# Patient Record
Sex: FEMALE | Race: WHITE | NOT HISPANIC OR LATINO | Employment: OTHER | URBAN - METROPOLITAN AREA
[De-identification: names, ages, dates, MRNs, and addresses within clinical notes are randomized per-mention and may not be internally consistent; named-entity substitution may affect disease eponyms.]

---

## 2017-11-16 PROBLEM — R11.2 NAUSEA AND VOMITING: Status: ACTIVE | Noted: 2017-11-16

## 2018-03-15 ENCOUNTER — TELEPHONE (OUTPATIENT)
Dept: PHARMACY | Facility: CLINIC | Age: 58
End: 2018-03-15

## 2018-03-21 NOTE — TELEPHONE ENCOUNTER
Faxed Griselda [place] form to Patients insurance ([facility] [phone]) @ 6:48 PM - KAREN

## 2018-03-23 ENCOUNTER — TELEPHONE (OUTPATIENT)
Dept: PHARMACY | Facility: CLINIC | Age: 58
End: 2018-03-23

## 2018-03-23 NOTE — TELEPHONE ENCOUNTER
"Initial Harvoni 90/400mg consult completed on 3/23. Harvoni 90/400mg will be shipped on 3/26 to arrive at patient's home on 3/27 via [employer]. $8.35 copay. Patient will start Harvoni 90/400mg on 3/28. Address confirmed, CC on file. Confirmed 2 patient identifiers - name and . Therapy Appropriate.    Harvoni- Take one tablet by mouth daily x 8 weeks  Counseling was reviewed:   1. Patient MUST take Harvoni at the SAME time every day.   2. Patient MUST avoid acid reducers without consulting with myself or provider first. Antacids are to be spaced out at least 4 hours apart from Harvoni. Patient states that they have omeprazole on med list however use it only "periodically when they are having heartburn and havent taken it in a very long time."  Patient was instructed to suspend all omeprazole at this time and if periodic GERD or other symptoms develop to use a traditional antacid as needed according to the 4 hour separation rules outlined above.  Patient acknowledged understanding.  3. Side effects include headaches and fatigue.   Headache: Patient may treat with OTC remedies. If Tylenol is used, dose should  not exceed 2000mg per day.  Patient advised to account for all sources of acetaminophen in this daily ceiling dose including both rx (norco) and non rx (tylenol) medications that are on current med list.     DDI: Medication list reviewed and potential DDIs addressed. No DDIs or allergies noted. Patient MUST contact myself or provider prior to starting any new OTC, herbal, or prescription drugs to avoid potential DDIs.    Discussed the importance of staying well hydrated while on therapy. Compliance stressed - patient to take missed doses as soon as remembered, but NOT to take 2 doses in one day. Patient will report questions or concerns to myself or practitioner. Patient verbalizes understanding. Patient plans to start Harvoni on 3/28. Consultation included: indication; goals of treatment; administration; storage " and handling; side effects; how to handle side effects; the importance of compliance; how to handle missed doses; the importance of laboratory monitoring; the importance of keeping all follow up appointments.  Patient understands to report any medication changes to [facility] and provider. All questions answered and addressed to patients satisfaction.  I will f/u with patient in 1 week from start, and [facility] will contact patient in 3 weeks to coordinate next refill.    LIZ Jo.Ph.  Clinical Pharmacist  [facility]  Phone: [phone]

## 2018-03-23 NOTE — TELEPHONE ENCOUNTER
DOCUMENTATION ONLY:  Prior authorization for Harvoni approved from 03/22/2018 to 05/22/2018 x 8 weeks of treatment.   Case ID# None    Co-pay: $8.35    Patient Assistance IS NOT required.     Forward to clinical pharmacist for consult & shipment.

## 2018-04-05 ENCOUNTER — TELEPHONE (OUTPATIENT)
Dept: PHARMACY | Facility: CLINIC | Age: 58
End: 2018-04-05

## 2018-04-05 NOTE — TELEPHONE ENCOUNTER
Initial clinical follow-up conducted for Griselda. Name/ confirmed. no missed doses; no new medications; no side effects noted. Patient understands to report any medication changes to [facility] and provider. All questions answered and addressed to patients satisfaction.      LIZ Jo.Ph.  Clinical Pharmacist  [facility]  Phone: [phone]

## 2018-04-16 ENCOUNTER — TELEPHONE (OUTPATIENT)
Dept: PHARMACY | Facility: CLINIC | Age: 58
End: 2018-04-16

## 2018-04-16 NOTE — TELEPHONE ENCOUNTER
Harvoni refill confirmed.  Patient's shipment scheduled for delivery 18.   $0 copay- 004. Confirmed 2 patient identifiers - name and .      Patient reports taking Harvoni routinely and has about 8 doses on hand.  No missed doses, no new medications, no new allergies or health conditions reported at this time. Patient reports mild fatigue but no other issues.  All questions answered and addressed to patients satisfaction. Pt verbalized understanding.    Monty Berumen, PharmD  Clinical Pharmacist  [facility]  [phone]

## 2018-05-30 ENCOUNTER — TELEPHONE (OUTPATIENT)
Dept: PHARMACY | Facility: CLINIC | Age: 58
End: 2018-05-30

## 2018-06-04 NOTE — TELEPHONE ENCOUNTER
Second attempt for qol assessment at [facility]  - na - number did not provide option to leave message - will continue to f/u.     LIZ Jo.Ph.  Clinical Pharmacist  [facility]  Phone: [phone]

## 2018-06-12 NOTE — TELEPHONE ENCOUNTER
"Patient called for QOL assessment at [facility] Harvoni x 12 weeks.  Patient states that they are feeling very well and happy to have completed treatment. She states that previous "stomach issues" she was having have resolved since being at [facility].  Patient reminded of the importance and significance of SVR 12 testing and counseled to keep all appts, labs, and follow-ups through SVR 12 and beyond.     LIZ Jo.Ph.  Clinical Pharmacist  [facility]  Phone: [phone]      "

## 2019-11-18 ENCOUNTER — HOSPITAL ENCOUNTER (EMERGENCY)
Facility: HOSPITAL | Age: 59
Discharge: HOME | End: 2019-11-18
Attending: SURGERY
Payer: MEDICARE

## 2019-11-18 VITALS
SYSTOLIC BLOOD PRESSURE: 101 MMHG | HEART RATE: 109 BPM | WEIGHT: 109.38 LBS | DIASTOLIC BLOOD PRESSURE: 65 MMHG | OXYGEN SATURATION: 95 % | BODY MASS INDEX: 21.47 KG/M2 | HEIGHT: 60 IN | TEMPERATURE: 99 F | RESPIRATION RATE: 20 BRPM

## 2019-11-18 DIAGNOSIS — J40 BRONCHITIS: Primary | ICD-10-CM

## 2019-11-18 LAB
DEPRECATED S PYO AG THROAT QL EIA: NEGATIVE
INFLUENZA A, MOLECULAR: NEGATIVE
INFLUENZA B, MOLECULAR: NEGATIVE
SPECIMEN SOURCE: NORMAL

## 2019-11-18 PROCEDURE — 25000242 PHARM REV CODE 250 ALT 637 W/ HCPCS: Performed by: SURGERY

## 2019-11-18 PROCEDURE — 96372 THER/PROPH/DIAG INJ SC/IM: CPT

## 2019-11-18 PROCEDURE — 94640 AIRWAY INHALATION TREATMENT: CPT

## 2019-11-18 PROCEDURE — 99284 EMERGENCY DEPT VISIT MOD MDM: CPT | Mod: 25

## 2019-11-18 PROCEDURE — 87081 CULTURE SCREEN ONLY: CPT

## 2019-11-18 PROCEDURE — 63600175 PHARM REV CODE 636 W HCPCS: Performed by: SURGERY

## 2019-11-18 PROCEDURE — 87880 STREP A ASSAY W/OPTIC: CPT

## 2019-11-18 PROCEDURE — 87502 INFLUENZA DNA AMP PROBE: CPT

## 2019-11-18 RX ORDER — METHYLPREDNISOLONE SOD SUCC 125 MG
125 VIAL (EA) INJECTION
Status: COMPLETED | OUTPATIENT
Start: 2019-11-18 | End: 2019-11-18

## 2019-11-18 RX ORDER — PROMETHAZINE HYDROCHLORIDE AND DEXTROMETHORPHAN HYDROBROMIDE 6.25; 15 MG/5ML; MG/5ML
5 SYRUP ORAL EVERY 6 HOURS PRN
Qty: 118 ML | Refills: 0 | Status: SHIPPED | OUTPATIENT
Start: 2019-11-18 | End: 2019-11-28

## 2019-11-18 RX ORDER — LEVOFLOXACIN 500 MG/1
500 TABLET, FILM COATED ORAL DAILY
Qty: 7 TABLET | Refills: 0 | Status: SHIPPED | OUTPATIENT
Start: 2019-11-18 | End: 2019-11-25

## 2019-11-18 RX ORDER — CEFTRIAXONE 1 G/1
1 INJECTION, POWDER, FOR SOLUTION INTRAMUSCULAR; INTRAVENOUS
Status: COMPLETED | OUTPATIENT
Start: 2019-11-18 | End: 2019-11-18

## 2019-11-18 RX ORDER — METHYLPREDNISOLONE 4 MG/1
TABLET ORAL
Qty: 1 PACKAGE | Refills: 0 | Status: ON HOLD | OUTPATIENT
Start: 2019-11-18 | End: 2020-10-16 | Stop reason: CLARIF

## 2019-11-18 RX ORDER — IPRATROPIUM BROMIDE AND ALBUTEROL SULFATE 2.5; .5 MG/3ML; MG/3ML
3 SOLUTION RESPIRATORY (INHALATION)
Status: COMPLETED | OUTPATIENT
Start: 2019-11-18 | End: 2019-11-18

## 2019-11-18 RX ADMIN — METHYLPREDNISOLONE SODIUM SUCCINATE 125 MG: 125 INJECTION, POWDER, FOR SOLUTION INTRAMUSCULAR; INTRAVENOUS at 01:11

## 2019-11-18 RX ADMIN — CEFTRIAXONE SODIUM 1 G: 1 INJECTION, POWDER, FOR SOLUTION INTRAMUSCULAR; INTRAVENOUS at 01:11

## 2019-11-18 RX ADMIN — IPRATROPIUM BROMIDE AND ALBUTEROL SULFATE 3 ML: .5; 3 SOLUTION RESPIRATORY (INHALATION) at 01:11

## 2019-11-18 NOTE — ED TRIAGE NOTES
59 y.o. female presents to ER   Chief Complaint   Patient presents with    Cough     x5 days, productive cough, decreased appetite   . No acute distress noted.

## 2019-11-18 NOTE — ED PROVIDER NOTES
[facility]                                                 Chief Complaint  59 y.o. female with Cough (x5 days, productive cough, decreased appetite)    History of Present Illness  Carmen Martinez presents to the emergency room with cough this week  Patient has had cough and congestion with wheezing at home reported now  Patient has a 97% oxygenation, patient is a long-time smoker years now  Patient has a dry hacking cough, no obvious sputum production in the ER  Patient states she has had no flu exposure, clear chest x-ray in the ER today    The history is provided by the patient   device was not used during this ER visit  Medical history:  Back pain, bulging disc, CBD dilation, hepatitis, neck pain  Surgeries: Colonoscopy  Allergies: Codeine    I have reviewed all of this patient's past medical, surgical, family, and social   histories as well as active allergies and medications documented in the  electronic medical record    Review of Systems and Physical Exam      Review of Systems  -- Constitution - no fever, denies fatigue, no weakness, no chills  -- Eyes - no tearing or redness, no visual disturbance  -- Ear, Nose - no tinnitus or earache, no nasal congestion or discharge  -- Mouth,Throat - no sore throat, no toothache, normal voice, normal swallowing  -- Respiratory - cough and congestion, no shortness of breath, no JARAMILLO  -- Cardiovascular - denies chest pain, no palpitations, denies claudication  -- Gastrointestinal - denies abdominal pain, nausea, vomiting, or diarrhea  -- Genitourinary - no dysuria, no hematuria, no flank pain, no bladder pain  -- Musculoskeletal - denies back pain, negative for trauma or injury  -- Neurological - no headache, denies weakness or seizure; no LOC  -- Skin - denies pallor, rash, or changes in skin. no hives or welts noted    Vital Signs  Her oral temperature is 99.2 °F (37.3 °C).   Her blood pressure is 126/69 and her pulse is 90.    Her respiration is 19 and oxygen saturation is 95%.     Physical Exam  -- Nursing note and vitals reviewed  -- Constitutional: Appears well-developed and well-nourished  -- Head: Atraumatic. Normocephalic. No obvious abnormality  -- Eyes: Pupils are equal and reactive to light. Normal conjunctiva and lids  -- Nose: Nose normal in appearance, nares grossly normal. No discharge  -- Throat: Mucous membranes moist, pharynx normal, normal tonsils. No lesions   -- Ears: External ears and TM normal bilaterally. Normal hearing and no drainage  -- Neck: Normal range of motion. Neck supple. No masses, trachea midline  -- Cardiac: Normal rate, regular rhythm and normal heart sounds  -- Pulmonary: faint rhonchi at the bilateral bases with no active wheezing g  -- Abdominal: Soft, no tenderness. Normal bowel sounds. Normal liver edge  -- Musculoskeletal: Normal range of motion, no effusions. Joints stable   -- Neurological: No focal deficits. Showed good interaction with staff  -- Skin: Warm and dry. No evidence of rash or cellulitis    Emergency Room Course      Treatment and Evaluation  -- The strep screen was negative  -- the patient tested negative for influenza   -- Chest x-ray showed no infiltrate and showed no acute pathology    Medications Given  albuterol-ipratropium 2.5 mg-0.5 mg/3 mL nebulizer solution 3 mL (3 mLs Nebulization Given 11/18/19 1300)   methylPREDNISolone sodium succinate injection 125 mg (125 mg Intramuscular Given 11/18/19 1320)   cefTRIAXone injection 1 g (1 g Intramuscular Given 11/18/19 1320)     Diagnosis  -- The encounter diagnosis was Bronchitis.    Disposition and Plan  -- Disposition: home  -- Condition: stable  -- Follow-up: Patient to follow up with Tyrese Parmar MD in 1-2 days.  -- I advised the patient that we have found no life threatening condition today  -- At this time, I believe the patient is clinically stable for discharge.   -- The patient acknowledges that close follow up with a  MD is required   -- Patient agrees to comply with all instruction and direction given in the ER    This note is dictated on M*Modal word recognition program.  There are word recognition mistakes that are occasionally missed on review.          Leroy Burgess MD  11/18/19 6259

## 2019-11-21 LAB — BACTERIA THROAT CULT: NORMAL

## 2020-10-16 ENCOUNTER — HOSPITAL ENCOUNTER (INPATIENT)
Facility: HOSPITAL | Age: 60
LOS: 3 days | Discharge: HOME | DRG: 872 | End: 2020-10-19
Attending: SURGERY | Admitting: FAMILY MEDICINE
Payer: MEDICARE

## 2020-10-16 DIAGNOSIS — N30.00 ACUTE CYSTITIS WITHOUT HEMATURIA: Primary | ICD-10-CM

## 2020-10-16 DIAGNOSIS — R11.0 NAUSEA: ICD-10-CM

## 2020-10-16 PROBLEM — A41.9 SEPSIS WITHOUT ACUTE ORGAN DYSFUNCTION: Status: ACTIVE | Noted: 2020-10-16

## 2020-10-16 PROBLEM — N12 PYELONEPHRITIS: Status: ACTIVE | Noted: 2020-10-16

## 2020-10-16 LAB
ALBUMIN SERPL BCP-MCNC: 3.2 G/DL (ref 3.5–5.2)
ALP SERPL-CCNC: 92 U/L (ref 55–135)
ALT SERPL W/O P-5'-P-CCNC: 18 U/L (ref 10–44)
AMPHET+METHAMPHET UR QL: NEGATIVE
ANION GAP SERPL CALC-SCNC: 14 MMOL/L (ref 8–16)
APTT BLDCRRT: 34.2 SEC (ref 21–32)
AST SERPL-CCNC: 24 U/L (ref 10–40)
BACTERIA #/AREA URNS HPF: ABNORMAL /HPF
BARBITURATES UR QL SCN>200 NG/ML: NEGATIVE
BASOPHILS # BLD AUTO: 0.03 K/UL (ref 0–0.2)
BASOPHILS NFR BLD: 0.2 % (ref 0–1.9)
BENZODIAZ UR QL SCN>200 NG/ML: NEGATIVE
BILIRUB SERPL-MCNC: 0.6 MG/DL (ref 0.1–1)
BILIRUB UR QL STRIP: NEGATIVE
BNP SERPL-MCNC: 12 PG/ML (ref 0–99)
BUN SERPL-MCNC: 10 MG/DL (ref 6–20)
BZE UR QL SCN: NEGATIVE
CALCIUM SERPL-MCNC: 9.7 MG/DL (ref 8.7–10.5)
CANNABINOIDS UR QL SCN: NORMAL
CHLORIDE SERPL-SCNC: 92 MMOL/L (ref 95–110)
CK MB SERPL-MCNC: 1.7 NG/ML (ref 0.1–6.5)
CK MB SERPL-MCNC: 1.7 NG/ML (ref 0.1–6.5)
CK MB SERPL-RTO: 1.6 % (ref 0–5)
CK MB SERPL-RTO: 1.7 % (ref 0–5)
CK SERPL-CCNC: 102 U/L (ref 20–180)
CK SERPL-CCNC: 102 U/L (ref 20–180)
CK SERPL-CCNC: 107 U/L (ref 20–180)
CK SERPL-CCNC: 107 U/L (ref 20–180)
CLARITY UR: CLEAR
CO2 SERPL-SCNC: 25 MMOL/L (ref 23–29)
COLOR UR: YELLOW
CREAT SERPL-MCNC: 0.9 MG/DL (ref 0.5–1.4)
CREAT UR-MCNC: 45.4 MG/DL (ref 15–325)
D DIMER PPP IA.FEU-MCNC: 1.87 MG/L FEU
DIFFERENTIAL METHOD: ABNORMAL
EOSINOPHIL # BLD AUTO: 0 K/UL (ref 0–0.5)
EOSINOPHIL NFR BLD: 0.1 % (ref 0–8)
ERYTHROCYTE [DISTWIDTH] IN BLOOD BY AUTOMATED COUNT: 12.1 % (ref 11.5–14.5)
EST. GFR  (AFRICAN AMERICAN): >60 ML/MIN/1.73 M^2
EST. GFR  (NON AFRICAN AMERICAN): >60 ML/MIN/1.73 M^2
GLUCOSE SERPL-MCNC: 121 MG/DL (ref 70–110)
GLUCOSE UR QL STRIP: NEGATIVE
GROUP A STREP, MOLECULAR: NEGATIVE
HCT VFR BLD AUTO: 39.8 % (ref 37–48.5)
HGB BLD-MCNC: 13.9 G/DL (ref 12–16)
HGB UR QL STRIP: ABNORMAL
HYALINE CASTS #/AREA URNS LPF: 0 /LPF
IMM GRANULOCYTES # BLD AUTO: 0.06 K/UL (ref 0–0.04)
IMM GRANULOCYTES NFR BLD AUTO: 0.4 % (ref 0–0.5)
INFLUENZA A, MOLECULAR: NEGATIVE
INFLUENZA B, MOLECULAR: NEGATIVE
INR PPP: 1.1 (ref 0.8–1.2)
KETONES UR QL STRIP: NEGATIVE
LACTATE SERPL-SCNC: 0.7 MMOL/L (ref 0.5–2.2)
LACTATE SERPL-SCNC: 2.7 MMOL/L (ref 0.5–2.2)
LACTATE SERPL-SCNC: 3.4 MMOL/L (ref 0.5–2.2)
LEUKOCYTE ESTERASE UR QL STRIP: ABNORMAL
LIPASE SERPL-CCNC: 16 U/L (ref 4–60)
LYMPHOCYTES # BLD AUTO: 1.1 K/UL (ref 1–4.8)
LYMPHOCYTES NFR BLD: 7.3 % (ref 18–48)
MAGNESIUM SERPL-MCNC: 2 MG/DL (ref 1.6–2.6)
MCH RBC QN AUTO: 30.8 PG (ref 27–31)
MCHC RBC AUTO-ENTMCNC: 34.9 G/DL (ref 32–36)
MCV RBC AUTO: 88 FL (ref 82–98)
METHADONE UR QL SCN>300 NG/ML: NEGATIVE
MICROSCOPIC COMMENT: ABNORMAL
MONOCYTES # BLD AUTO: 1.7 K/UL (ref 0.3–1)
MONOCYTES NFR BLD: 11.6 % (ref 4–15)
NEUTROPHILS # BLD AUTO: 11.7 K/UL (ref 1.8–7.7)
NEUTROPHILS NFR BLD: 80.4 % (ref 38–73)
NITRITE UR QL STRIP: NEGATIVE
NRBC BLD-RTO: 0 /100 WBC
OPIATES UR QL SCN: NORMAL
PCP UR QL SCN>25 NG/ML: NEGATIVE
PH UR STRIP: 7 [PH] (ref 5–8)
PLATELET # BLD AUTO: 320 K/UL (ref 150–350)
PMV BLD AUTO: 9.9 FL (ref 9.2–12.9)
POTASSIUM SERPL-SCNC: 2.9 MMOL/L (ref 3.5–5.1)
PROCALCITONIN SERPL IA-MCNC: 0.72 NG/ML
PROT SERPL-MCNC: 7.9 G/DL (ref 6–8.4)
PROT UR QL STRIP: ABNORMAL
PROTHROMBIN TIME: 11.3 SEC (ref 9–12.5)
RBC # BLD AUTO: 4.51 M/UL (ref 4–5.4)
RBC #/AREA URNS HPF: 10 /HPF (ref 0–4)
SARS-COV-2 RDRP RESP QL NAA+PROBE: NEGATIVE
SODIUM SERPL-SCNC: 131 MMOL/L (ref 136–145)
SP GR UR STRIP: 1.01 (ref 1–1.03)
SPECIMEN SOURCE: NORMAL
SQUAMOUS #/AREA URNS HPF: 5 /HPF
TOXICOLOGY INFORMATION: NORMAL
TROPONIN I SERPL DL<=0.01 NG/ML-MCNC: 0.01 NG/ML (ref 0–0.03)
TROPONIN I SERPL DL<=0.01 NG/ML-MCNC: <0.006 NG/ML (ref 0–0.03)
URN SPEC COLLECT METH UR: ABNORMAL
UROBILINOGEN UR STRIP-ACNC: NEGATIVE EU/DL
WBC # BLD AUTO: 14.51 K/UL (ref 3.9–12.7)
WBC #/AREA URNS HPF: >100 /HPF (ref 0–5)

## 2020-10-16 PROCEDURE — 87651 STREP A DNA AMP PROBE: CPT

## 2020-10-16 PROCEDURE — 25500020 PHARM REV CODE 255: Performed by: SURGERY

## 2020-10-16 PROCEDURE — 83880 ASSAY OF NATRIURETIC PEPTIDE: CPT

## 2020-10-16 PROCEDURE — 87502 INFLUENZA DNA AMP PROBE: CPT

## 2020-10-16 PROCEDURE — 81000 URINALYSIS NONAUTO W/SCOPE: CPT | Mod: 59

## 2020-10-16 PROCEDURE — 84145 PROCALCITONIN (PCT): CPT

## 2020-10-16 PROCEDURE — 80053 COMPREHEN METABOLIC PANEL: CPT

## 2020-10-16 PROCEDURE — 84484 ASSAY OF TROPONIN QUANT: CPT

## 2020-10-16 PROCEDURE — 63600175 PHARM REV CODE 636 W HCPCS: Performed by: SURGERY

## 2020-10-16 PROCEDURE — 96367 TX/PROPH/DG ADDL SEQ IV INF: CPT

## 2020-10-16 PROCEDURE — 99223 1ST HOSP IP/OBS HIGH 75: CPT | Mod: AI,,, | Performed by: FAMILY MEDICINE

## 2020-10-16 PROCEDURE — 87086 URINE CULTURE/COLONY COUNT: CPT

## 2020-10-16 PROCEDURE — 87040 BLOOD CULTURE FOR BACTERIA: CPT | Mod: 59

## 2020-10-16 PROCEDURE — 83690 ASSAY OF LIPASE: CPT

## 2020-10-16 PROCEDURE — 82550 ASSAY OF CK (CPK): CPT | Mod: 91

## 2020-10-16 PROCEDURE — 11000001 HC ACUTE MED/SURG PRIVATE ROOM

## 2020-10-16 PROCEDURE — 83605 ASSAY OF LACTIC ACID: CPT | Mod: 91

## 2020-10-16 PROCEDURE — 80307 DRUG TEST PRSMV CHEM ANLYZR: CPT

## 2020-10-16 PROCEDURE — 85025 COMPLETE CBC W/AUTO DIFF WBC: CPT

## 2020-10-16 PROCEDURE — 96361 HYDRATE IV INFUSION ADD-ON: CPT

## 2020-10-16 PROCEDURE — 85610 PROTHROMBIN TIME: CPT

## 2020-10-16 PROCEDURE — 85730 THROMBOPLASTIN TIME PARTIAL: CPT

## 2020-10-16 PROCEDURE — 99285 EMERGENCY DEPT VISIT HI MDM: CPT | Mod: 25

## 2020-10-16 PROCEDURE — G0378 HOSPITAL OBSERVATION PER HR: HCPCS

## 2020-10-16 PROCEDURE — 87088 URINE BACTERIA CULTURE: CPT

## 2020-10-16 PROCEDURE — 87186 SC STD MICRODIL/AGAR DIL: CPT

## 2020-10-16 PROCEDURE — 93010 ELECTROCARDIOGRAM REPORT: CPT | Mod: ,,, | Performed by: INTERNAL MEDICINE

## 2020-10-16 PROCEDURE — 25000003 PHARM REV CODE 250: Performed by: SURGERY

## 2020-10-16 PROCEDURE — U0002 COVID-19 LAB TEST NON-CDC: HCPCS

## 2020-10-16 PROCEDURE — 25000003 PHARM REV CODE 250: Performed by: FAMILY MEDICINE

## 2020-10-16 PROCEDURE — 93005 ELECTROCARDIOGRAM TRACING: CPT

## 2020-10-16 PROCEDURE — 82553 CREATINE MB FRACTION: CPT

## 2020-10-16 PROCEDURE — 99223 PR INITIAL HOSPITAL CARE,LEVL III: ICD-10-PCS | Mod: AI,,, | Performed by: FAMILY MEDICINE

## 2020-10-16 PROCEDURE — 87077 CULTURE AEROBIC IDENTIFY: CPT

## 2020-10-16 PROCEDURE — 93010 EKG 12-LEAD: ICD-10-PCS | Mod: ,,, | Performed by: INTERNAL MEDICINE

## 2020-10-16 PROCEDURE — 96365 THER/PROPH/DIAG IV INF INIT: CPT

## 2020-10-16 PROCEDURE — 36415 COLL VENOUS BLD VENIPUNCTURE: CPT

## 2020-10-16 PROCEDURE — 83735 ASSAY OF MAGNESIUM: CPT

## 2020-10-16 PROCEDURE — 85379 FIBRIN DEGRADATION QUANT: CPT

## 2020-10-16 RX ORDER — ONDANSETRON 2 MG/ML
4 INJECTION INTRAMUSCULAR; INTRAVENOUS EVERY 8 HOURS PRN
Status: DISCONTINUED | OUTPATIENT
Start: 2020-10-16 | End: 2020-10-19 | Stop reason: HOSPADM

## 2020-10-16 RX ORDER — GABAPENTIN 300 MG/1
300 CAPSULE ORAL 3 TIMES DAILY
Status: DISCONTINUED | OUTPATIENT
Start: 2020-10-16 | End: 2020-10-19 | Stop reason: HOSPADM

## 2020-10-16 RX ORDER — HYDROCODONE BITARTRATE AND ACETAMINOPHEN 10; 325 MG/1; MG/1
1 TABLET ORAL EVERY 12 HOURS PRN
Status: DISCONTINUED | OUTPATIENT
Start: 2020-10-16 | End: 2020-10-19 | Stop reason: HOSPADM

## 2020-10-16 RX ORDER — AMITRIPTYLINE HYDROCHLORIDE 25 MG/1
50 TABLET, FILM COATED ORAL NIGHTLY
Status: DISCONTINUED | OUTPATIENT
Start: 2020-10-16 | End: 2020-10-19 | Stop reason: HOSPADM

## 2020-10-16 RX ORDER — AMITRIPTYLINE HYDROCHLORIDE 50 MG/1
50 TABLET, FILM COATED ORAL NIGHTLY
COMMUNITY

## 2020-10-16 RX ORDER — POTASSIUM CHLORIDE 20 MEQ/1
60 TABLET, EXTENDED RELEASE ORAL ONCE
Status: COMPLETED | OUTPATIENT
Start: 2020-10-16 | End: 2020-10-16

## 2020-10-16 RX ORDER — SODIUM CHLORIDE 0.9 % (FLUSH) 0.9 %
10 SYRINGE (ML) INJECTION
Status: DISCONTINUED | OUTPATIENT
Start: 2020-10-16 | End: 2020-10-19 | Stop reason: HOSPADM

## 2020-10-16 RX ORDER — SODIUM CHLORIDE 9 MG/ML
INJECTION, SOLUTION INTRAVENOUS CONTINUOUS
Status: DISCONTINUED | OUTPATIENT
Start: 2020-10-16 | End: 2020-10-19 | Stop reason: HOSPADM

## 2020-10-16 RX ORDER — ACETAMINOPHEN 325 MG/1
650 TABLET ORAL EVERY 8 HOURS PRN
Status: DISCONTINUED | OUTPATIENT
Start: 2020-10-16 | End: 2020-10-19 | Stop reason: HOSPADM

## 2020-10-16 RX ADMIN — CEFTRIAXONE 2 G: 2 INJECTION, SOLUTION INTRAVENOUS at 12:10

## 2020-10-16 RX ADMIN — SODIUM CHLORIDE 1000 ML: 0.9 INJECTION, SOLUTION INTRAVENOUS at 02:10

## 2020-10-16 RX ADMIN — IOHEXOL 50 ML: 350 INJECTION, SOLUTION INTRAVENOUS at 01:10

## 2020-10-16 RX ADMIN — SODIUM CHLORIDE 1000 ML: 0.9 INJECTION, SOLUTION INTRAVENOUS at 11:10

## 2020-10-16 RX ADMIN — SODIUM CHLORIDE 1000 ML: 0.9 INJECTION, SOLUTION INTRAVENOUS at 03:10

## 2020-10-16 RX ADMIN — IOHEXOL 75 ML: 350 INJECTION, SOLUTION INTRAVENOUS at 01:10

## 2020-10-16 RX ADMIN — PROMETHAZINE HYDROCHLORIDE 25 MG: 25 INJECTION INTRAMUSCULAR; INTRAVENOUS at 11:10

## 2020-10-16 RX ADMIN — AMITRIPTYLINE HYDROCHLORIDE 50 MG: 25 TABLET, FILM COATED ORAL at 08:10

## 2020-10-16 RX ADMIN — POTASSIUM CHLORIDE 60 MEQ: 1500 TABLET, EXTENDED RELEASE ORAL at 08:10

## 2020-10-16 RX ADMIN — GABAPENTIN 300 MG: 300 CAPSULE ORAL at 08:10

## 2020-10-16 RX ADMIN — SODIUM CHLORIDE: 0.9 INJECTION, SOLUTION INTRAVENOUS at 07:10

## 2020-10-16 NOTE — PLAN OF CARE
OBSERVATION level of care criteria MET.     10/16/20 1747   ED Admissions Case Approval   ED Admissions Case Approval  Approved    Urinary Tract Infection (UTI): Observation Care - Observation Care Admission Criteria by Aislinn Rodríguez RN       Met: Reviewed on 10/16/2020 by Aislinn Rodríguez RN       Created Using Review Status Review Entered   Indicia® Completed 10/16/2020 17:46       Criteria Set Name - Subset   Urinary Tract Infection (UTI): Observation Care - Observation Care Admission Criteria      Criteria Review      Observation Care Admission Criteria    Most Recent : Aislinn Rodríguez Most Recent Date: 10/16/2020 5:46 PM    (X) Observation is appropriate for patient with  1 or more  of the following  (1) (2) (3) (4)    (5) (6):       (X) Adult with  1 or more  of the following :          (X) Vomiting

## 2020-10-16 NOTE — ED TRIAGE NOTES
Pt reports vomiting x 1 week and not being able to keep any food down just water. Pt reports being short winded at this time as well.

## 2020-10-16 NOTE — ED PROVIDER NOTES
[facility]                                                 Chief Complaint  60 y.o. female with Emesis      History of Present Illness  Carmen Martinez presents to the emergency room with nausea vomiting  Patient states she has had concurrent nausea vomiting for last 3 days now  Patient has been unable to keep anything down, denies any abdominal pain  Patient denies any fever, patient has no previous history of emesis reported  Patient states she just does not feel well, feels dehydrated on ER interview    The history is provided by the patient   device was not used during this ER visit  Medical history:  Back pain, bulging disc, CBD dilation, hepatitis, neck pain  Surgeries: Colonoscopy  Allergies: Codeine    I have reviewed all of this patient's past medical, surgical, family, and social   histories as well as active allergies and medications documented in the  electronic medical record    Review of Systems and Physical Exam      Review of Systems  -- Constitution - no fever, denies fatigue, no weakness, no chills  -- Eyes - no tearing or redness, no visual disturbance  -- Ear, Nose - no tinnitus or earache, no nasal congestion or discharge  -- Mouth,Throat - no sore throat, no toothache, normal voice, normal swallowing  -- Respiratory - denies cough and congestion, no shortness of breath, no JARAMILLO  -- Cardiovascular - denies chest pain, no palpitations, denies claudication  -- Gastrointestinal - nausea, vomiting, no abdominal pain or diarrhea  -- Genitourinary - no dysuria, denies flank pain, no hematuria, no STD risk  -- Musculoskeletal - denies back pain, negative for trauma or injury  -- Neurological - no headache, denies weakness or seizure; no LOC  -- Skin - denies pallor, rash, or changes in skin. no hives or welts noted     Vital Signs  Her temperature is 96.9 °F (36.1 °C).   Her blood pressure is 125/60 and her pulse is 105.   Her respiration is 22 and oxygen  saturation is 100%.     Physical Exam  -- Nursing note and vitals reviewed  -- Constitutional: Appears well-developed and well-nourished  -- Head: Atraumatic. Normocephalic. No obvious abnormality  -- Eyes: Pupils are equal and reactive to light. Normal conjunctiva and lids  -- Cardiac: Normal rate, regular rhythm and normal heart sounds  -- Pulmonary: Normal respiratory effort, breath sounds clear to auscultation  -- Abdominal: Soft, no tenderness. Normal bowel sounds. Normal liver edge  -- Genitourinary:  Mild bilateral flank pain, no suprapubic pain  -- Musculoskeletal: Normal range of motion, no effusions. Joints stable   -- Neurological: No focal deficits. Showed good interaction with staff  -- Vascular: Posterior tibial, dorsalis pedis and radial pulses 2+ bilaterally      Emergency Room Course      Urinalysis  Protein, UA 1+ (*)   Occult Blood UA 2+ (*)   Leukocytes, UA 2+ (*)   RBC, UA 10 (*)   WBC, UA >100 (*)   Bacteria Moderate (*)     Lab Results   (L)   K 2.9 (L)   CL 92 (L)   CO2 25   BUN 10   CREATININE 0.9    (H)   ALKPHOS 92   AST 24   ALT 18   BILITOT 0.6   ALBUMIN 3.2 (L)   PROT 7.9   WBC 14.51 (H)   HGB 13.9   HCT 39.8            CPKMB 1.7   TROPONINI 0.010   INR 1.1   BNP 12   DDIMER 1.87 (H)   LACTATE 3.4 (H)     EKG  -- The EKG findings today were without concerning findings from baseline     CT chest abdomen pelvis  No evidence for aortic dissection or pulmonary embolus.   Mild bronchitis.   Imaging findings suggestive for bilateral pyelonephritis.  Correlation with urinalysis is recommended.   The common bile duct is slightly prominent at 8 mm distal stone, mass and/or stricture not excluded.  Previous MRI of the abdomen show the common bile duct to also be dilated and as such this is a chronic finding.     Additional Work up  -- Chest x-ray showed no infiltrate and showed no acute pathology  -- Blood cultures have also been drawn, results are pending  --  The urine today has been sent for lab culture, results pending   -- rapid Coronavirus PCR was negative    Medications Given  sodium chloride 0.9% bolus 1,000 mL (0 mLs Intravenous Stopped 10/16/20 1249)   promethazine (PHENERGAN) 25 mg in dextrose 5 % 50 mL IVPB (0 mg Intravenous Stopped 10/16/20 1140)   cefTRIAXone (ROCEPHIN) 2 g/50 mL D5W IVPB (0 g Intravenous Stopped 10/16/20 1400)   iohexoL (OMNIPAQUE 350) injection 30 mL (50 mLs Oral Given 10/16/20 1336)   iohexoL (OMNIPAQUE 350) injection 75 mL (75 mLs Intravenous Given 10/16/20 1336)   sodium chloride 0.9% bolus 1,000 mL (1,000 mLs Intravenous New Bag 10/16/20 1439)   sodium chloride 0.9% bolus 1,000 mL (1,000 mLs Intravenous New Bag 10/16/20 1513)     ED Physician Management  -- Diagnosis management comments: 60 y.o. female with nausea vomiting  -- evaluations a surgery tract infection with [place] [id number] on labs  -- pt on CT has bilateral pyelonephritis, no history of pyelonephritis reported  -- urine sent for culture, IV Rocephin given the ER per sepsis protocol  -- 3 liters of IV fluids given, unfortunately lactic acid increased to 3.4 today  -- will admit for IV fluids and evaluation lactic acid/sepsis on observation  -- patient discussed at length with Dr. Camejo, agrees with plan of care    Diagnosis  [R11.0] Nausea  [N30.00] Acute cystitis without hematuria (Primary)    Disposition and Plan  -- Disposition: admit  -- Condition: stable    This note is dictated on M*Modal word recognition program.  There are word recognition mistakes that are occasionally missed on review.         Leroy Burgess MD  10/16/20 3895

## 2020-10-16 NOTE — NURSING
"Bedside hand off report received from LUBNA Luu. PT lying in bed. Pt c/o shortness of breath but sats 98% on RA. Lungs BCTA. No signs of distress noted. Pt with dry cough. Pt c/o decreased appetite, nausea, diarrhea, and states that "she can only smell one smell". Bed in lowest position with call light in reach. Will continue to monitor.   "

## 2020-10-17 PROBLEM — R43.9 SMELL OR TASTE SENSATION DISTURBANCE: Status: ACTIVE | Noted: 2020-10-17

## 2020-10-17 LAB
ALBUMIN SERPL BCP-MCNC: 2.7 G/DL (ref 3.5–5.2)
ALP SERPL-CCNC: 73 U/L (ref 55–135)
ALT SERPL W/O P-5'-P-CCNC: 20 U/L (ref 10–44)
ANION GAP SERPL CALC-SCNC: 13 MMOL/L (ref 8–16)
ANION GAP SERPL CALC-SCNC: 15 MMOL/L (ref 8–16)
AST SERPL-CCNC: 19 U/L (ref 10–40)
BASOPHILS # BLD AUTO: 0.01 K/UL (ref 0–0.2)
BASOPHILS NFR BLD: 0.1 % (ref 0–1.9)
BILIRUB SERPL-MCNC: 0.4 MG/DL (ref 0.1–1)
BUN SERPL-MCNC: 6 MG/DL (ref 6–20)
BUN SERPL-MCNC: 6 MG/DL (ref 6–20)
CALCIUM SERPL-MCNC: 8.5 MG/DL (ref 8.7–10.5)
CALCIUM SERPL-MCNC: 9 MG/DL (ref 8.7–10.5)
CHLORIDE SERPL-SCNC: 104 MMOL/L (ref 95–110)
CHLORIDE SERPL-SCNC: 106 MMOL/L (ref 95–110)
CO2 SERPL-SCNC: 16 MMOL/L (ref 23–29)
CO2 SERPL-SCNC: 19 MMOL/L (ref 23–29)
CREAT SERPL-MCNC: 0.7 MG/DL (ref 0.5–1.4)
CREAT SERPL-MCNC: 0.7 MG/DL (ref 0.5–1.4)
DIFFERENTIAL METHOD: ABNORMAL
EOSINOPHIL # BLD AUTO: 0 K/UL (ref 0–0.5)
EOSINOPHIL NFR BLD: 0 % (ref 0–8)
ERYTHROCYTE [DISTWIDTH] IN BLOOD BY AUTOMATED COUNT: 12.3 % (ref 11.5–14.5)
EST. GFR  (AFRICAN AMERICAN): >60 ML/MIN/1.73 M^2
EST. GFR  (AFRICAN AMERICAN): >60 ML/MIN/1.73 M^2
EST. GFR  (NON AFRICAN AMERICAN): >60 ML/MIN/1.73 M^2
EST. GFR  (NON AFRICAN AMERICAN): >60 ML/MIN/1.73 M^2
GLUCOSE SERPL-MCNC: 89 MG/DL (ref 70–110)
GLUCOSE SERPL-MCNC: 99 MG/DL (ref 70–110)
HCT VFR BLD AUTO: 34 % (ref 37–48.5)
HGB BLD-MCNC: 11.6 G/DL (ref 12–16)
IMM GRANULOCYTES # BLD AUTO: 0.08 K/UL (ref 0–0.04)
IMM GRANULOCYTES NFR BLD AUTO: 0.6 % (ref 0–0.5)
LACTATE SERPL-SCNC: 0.7 MMOL/L (ref 0.5–2.2)
LACTATE SERPL-SCNC: 1.2 MMOL/L (ref 0.5–2.2)
LYMPHOCYTES # BLD AUTO: 1.1 K/UL (ref 1–4.8)
LYMPHOCYTES NFR BLD: 8.5 % (ref 18–48)
MCH RBC QN AUTO: 30.9 PG (ref 27–31)
MCHC RBC AUTO-ENTMCNC: 34.1 G/DL (ref 32–36)
MCV RBC AUTO: 90 FL (ref 82–98)
MONOCYTES # BLD AUTO: 1.5 K/UL (ref 0.3–1)
MONOCYTES NFR BLD: 11.1 % (ref 4–15)
NEUTROPHILS # BLD AUTO: 10.4 K/UL (ref 1.8–7.7)
NEUTROPHILS NFR BLD: 79.7 % (ref 38–73)
NRBC BLD-RTO: 0 /100 WBC
PLATELET # BLD AUTO: 316 K/UL (ref 150–350)
PMV BLD AUTO: 9.9 FL (ref 9.2–12.9)
POTASSIUM SERPL-SCNC: 2.6 MMOL/L (ref 3.5–5.1)
POTASSIUM SERPL-SCNC: 4 MMOL/L (ref 3.5–5.1)
PROT SERPL-MCNC: 6.7 G/DL (ref 6–8.4)
RBC # BLD AUTO: 3.76 M/UL (ref 4–5.4)
SODIUM SERPL-SCNC: 136 MMOL/L (ref 136–145)
SODIUM SERPL-SCNC: 137 MMOL/L (ref 136–145)
WBC # BLD AUTO: 13.04 K/UL (ref 3.9–12.7)

## 2020-10-17 PROCEDURE — 80053 COMPREHEN METABOLIC PANEL: CPT

## 2020-10-17 PROCEDURE — 80048 BASIC METABOLIC PNL TOTAL CA: CPT

## 2020-10-17 PROCEDURE — 96361 HYDRATE IV INFUSION ADD-ON: CPT

## 2020-10-17 PROCEDURE — 96376 TX/PRO/DX INJ SAME DRUG ADON: CPT

## 2020-10-17 PROCEDURE — 11000001 HC ACUTE MED/SURG PRIVATE ROOM

## 2020-10-17 PROCEDURE — 99233 PR SUBSEQUENT HOSPITAL CARE,LEVL III: ICD-10-PCS | Mod: ,,, | Performed by: FAMILY MEDICINE

## 2020-10-17 PROCEDURE — 85025 COMPLETE CBC W/AUTO DIFF WBC: CPT

## 2020-10-17 PROCEDURE — 25000003 PHARM REV CODE 250: Performed by: FAMILY MEDICINE

## 2020-10-17 PROCEDURE — 83605 ASSAY OF LACTIC ACID: CPT | Mod: 91

## 2020-10-17 PROCEDURE — 96367 TX/PROPH/DG ADDL SEQ IV INF: CPT

## 2020-10-17 PROCEDURE — G0378 HOSPITAL OBSERVATION PER HR: HCPCS

## 2020-10-17 PROCEDURE — 96366 THER/PROPH/DIAG IV INF ADDON: CPT

## 2020-10-17 PROCEDURE — 63600175 PHARM REV CODE 636 W HCPCS: Performed by: FAMILY MEDICINE

## 2020-10-17 PROCEDURE — 99233 SBSQ HOSP IP/OBS HIGH 50: CPT | Mod: ,,, | Performed by: FAMILY MEDICINE

## 2020-10-17 PROCEDURE — 83605 ASSAY OF LACTIC ACID: CPT

## 2020-10-17 PROCEDURE — U0003 INFECTIOUS AGENT DETECTION BY NUCLEIC ACID (DNA OR RNA); SEVERE ACUTE RESPIRATORY SYNDROME CORONAVIRUS 2 (SARS-COV-2) (CORONAVIRUS DISEASE [COVID-19]), AMPLIFIED PROBE TECHNIQUE, MAKING USE OF HIGH THROUGHPUT TECHNOLOGIES AS DESCRIBED BY CMS-2020-01-R: HCPCS

## 2020-10-17 PROCEDURE — 36415 COLL VENOUS BLD VENIPUNCTURE: CPT

## 2020-10-17 RX ORDER — POTASSIUM CHLORIDE 20 MEQ/1
40 TABLET, EXTENDED RELEASE ORAL DAILY
Status: DISCONTINUED | OUTPATIENT
Start: 2020-10-18 | End: 2020-10-19 | Stop reason: HOSPADM

## 2020-10-17 RX ORDER — POTASSIUM CHLORIDE 20 MEQ/1
60 TABLET, EXTENDED RELEASE ORAL 3 TIMES DAILY
Status: DISCONTINUED | OUTPATIENT
Start: 2020-10-17 | End: 2020-10-17

## 2020-10-17 RX ADMIN — POTASSIUM CHLORIDE 60 MEQ: 1500 TABLET, EXTENDED RELEASE ORAL at 08:10

## 2020-10-17 RX ADMIN — POTASSIUM CHLORIDE 60 MEQ: 1500 TABLET, EXTENDED RELEASE ORAL at 06:10

## 2020-10-17 RX ADMIN — POTASSIUM CHLORIDE: 2 INJECTION, SOLUTION, CONCENTRATE INTRAVENOUS at 09:10

## 2020-10-17 RX ADMIN — CEFTRIAXONE 2 G: 2 INJECTION, SOLUTION INTRAVENOUS at 03:10

## 2020-10-17 RX ADMIN — SODIUM CHLORIDE: 0.9 INJECTION, SOLUTION INTRAVENOUS at 03:10

## 2020-10-17 RX ADMIN — AMITRIPTYLINE HYDROCHLORIDE 50 MG: 25 TABLET, FILM COATED ORAL at 09:10

## 2020-10-17 RX ADMIN — POTASSIUM CHLORIDE 60 MEQ: 1500 TABLET, EXTENDED RELEASE ORAL at 03:10

## 2020-10-17 RX ADMIN — GABAPENTIN 300 MG: 300 CAPSULE ORAL at 08:10

## 2020-10-17 RX ADMIN — GABAPENTIN 300 MG: 300 CAPSULE ORAL at 09:10

## 2020-10-17 RX ADMIN — GABAPENTIN 300 MG: 300 CAPSULE ORAL at 03:10

## 2020-10-17 NOTE — NURSING
Dr. Camejo notified that pt cannot taste, smell, is having diarrhea, and gets short of breath. No new orders received. Will continue to monitor.

## 2020-10-17 NOTE — SUBJECTIVE & OBJECTIVE
Past Medical History:   Diagnosis Date    Back pain     Bulging disc     Common bile duct dilation     Hepatitis     HCV-tx' w/ Harvoni 3/2018    Neck pain        Past Surgical History:   Procedure Laterality Date    COLONOSCOPY  2014       Review of patient's allergies indicates:   Allergen Reactions    Codeine Itching       No current facility-administered medications on file prior to encounter.      Current Outpatient Medications on File Prior to Encounter   Medication Sig    ACETAMINOPHEN (TYLENOL EXTRA STRENGTH ORAL) Take by mouth.    amitriptyline (ELAVIL) 50 MG tablet Take 50 mg by mouth every evening.    gabapentin (NEURONTIN) 300 MG capsule Take 1 capsule (300 mg total) by mouth 3 (three) times daily.    hydrocodone-acetaminophen 10-325mg (NORCO)  mg Tab Take 1 tablet by mouth 4 (four) times daily as needed.    [DISCONTINUED] ascorbic acid (VITAMIN C) 500 MG tablet Take 500 mg by mouth once daily.    [DISCONTINUED] cranberry 500 mg Cap Take 1 capsule by mouth once daily.    [DISCONTINUED] cyclobenzaprine (FLEXERIL) 5 MG tablet TAKE 1 AND 1/2 TABLETS BY MOUTH THREE TIMES DAILY AS NEEDED    [DISCONTINUED] fish oil-omega-3 fatty acids 300-1,000 mg capsule Take 1 g by mouth once daily.    [DISCONTINUED] garlic 1,000 mg Cap Take 1 tablet by mouth once daily.    [DISCONTINUED] methylPREDNISolone (MEDROL DOSEPACK) 4 mg tablet Pack as directed    [DISCONTINUED] omeprazole (PRILOSEC) 20 MG capsule Take 2 capsules (40 mg total) by mouth once daily.    [DISCONTINUED] vitamin E 400 UNIT capsule Take 400 Units by mouth once daily.     Family History     Problem Relation (Age of Onset)    Heart disease Mother        Tobacco Use    Smoking status: Current Every Day Smoker     Packs/day: 1.00     Years: 40.00     Pack years: 40.00     Types: Cigarettes     Start date: 9/21/1976    Smokeless tobacco: Never Used   Substance and Sexual Activity    Alcohol use: No     Comment: heavy until 10 years  ago    Drug use: No     Comment: NANDO in 80s    Sexual activity: Never     Review of Systems   Constitutional: Positive for chills, fatigue and fever.   HENT: Negative for congestion and sore throat.    Respiratory: Positive for cough and shortness of breath.    Cardiovascular: Negative for chest pain and palpitations.   Gastrointestinal: Positive for diarrhea, nausea and vomiting. Negative for abdominal pain, blood in stool and constipation.   Genitourinary: Positive for dysuria and flank pain. Negative for difficulty urinating.   Musculoskeletal: Negative for arthralgias.   Neurological: Positive for light-headedness and headaches. Negative for dizziness and syncope.   Psychiatric/Behavioral: Negative for dysphoric mood, sleep disturbance and suicidal ideas. The patient is not nervous/anxious.      Objective:     Vital Signs (Most Recent):  Temp: 100.1 °F (37.8 °C) (10/16/20 1832)  Pulse: 97 (10/16/20 1832)  Resp: 20 (10/16/20 1832)  BP: 119/64 (10/16/20 1832)  SpO2: 100 % (10/16/20 1621) Vital Signs (24h Range):  Temp:  [96.9 °F (36.1 °C)-100.1 °F (37.8 °C)] 100.1 °F (37.8 °C)  Pulse:  [] 97  Resp:  [20-22] 20  SpO2:  [98 %-100 %] 100 %  BP: (103-125)/(60-69) 119/64     Weight: 54.5 kg (120 lb 2.4 oz)  Body mass index is 21.98 kg/m².    Physical Exam  Constitutional:       Appearance: She is well-developed. She is not ill-appearing.      Comments: Looks much older than stated age    HENT:      Head: Normocephalic and atraumatic.      Right Ear: External ear normal.      Left Ear: External ear normal.      Nose: Nose normal.   Eyes:      Pupils: Pupils are equal, round, and reactive to light.   Neck:      Musculoskeletal: Normal range of motion and neck supple.      Thyroid: No thyromegaly.      Vascular: No JVD.      Trachea: No tracheal deviation.   Cardiovascular:      Rate and Rhythm: Normal rate.      Heart sounds: Normal heart sounds. No murmur.   Pulmonary:      Effort: Pulmonary effort is normal.  No respiratory distress.      Breath sounds: Normal breath sounds. No wheezing or rales.   Chest:      Chest wall: No tenderness.   Abdominal:      General: Bowel sounds are normal. There is no distension.      Palpations: Abdomen is soft. There is no mass.      Tenderness: There is no abdominal tenderness. There is no guarding or rebound.   Musculoskeletal: Normal range of motion.         General: Tenderness present.      Comments: CVA TTP B   Lymphadenopathy:      Cervical: No cervical adenopathy.   Skin:     General: Skin is warm and dry.      Coloration: Skin is not pale.      Findings: No erythema or rash.   Neurological:      Mental Status: She is alert and oriented to person, place, and time.      Cranial Nerves: No cranial nerve deficit.      Motor: No abnormal muscle tone.      Coordination: Coordination normal.      Deep Tendon Reflexes: Reflexes are normal and symmetric. Reflexes normal.   Psychiatric:         Behavior: Behavior normal.         Thought Content: Thought content normal.         Judgment: Judgment normal.           CRANIAL NERVES     CN III, IV, VI   Pupils are equal, round, and reactive to light.       Significant Labs:   Blood Culture: No results for input(s): LABBLOO in the last 48 hours.  CBC:   Recent Labs   Lab 10/16/20  1109   WBC 14.51*   HGB 13.9   HCT 39.8        CMP:   Recent Labs   Lab 10/16/20  1109   *   K 2.9*   CL 92*   CO2 25   *   BUN 10   CREATININE 0.9   CALCIUM 9.7   PROT 7.9   ALBUMIN 3.2*   BILITOT 0.6   ALKPHOS 92   AST 24   ALT 18   ANIONGAP 14   EGFRNONAA >60     Lactic Acid:   Recent Labs   Lab 10/16/20  1313 10/16/20  1552   LACTATE 2.7* 3.4*     Urine Culture: No results for input(s): LABURIN in the last 48 hours.  Urine Studies:   Recent Labs   Lab 10/16/20  1212   COLORU Yellow   APPEARANCEUA Clear   PHUR 7.0   SPECGRAV 1.010   PROTEINUA 1+*   GLUCUA Negative   KETONESU Negative   BILIRUBINUA Negative   OCCULTUA 2+*   NITRITE Negative    UROBILINOGEN Negative   LEUKOCYTESUR 2+*   RBCUA 10*   WBCUA >100*   BACTERIA Moderate*   SQUAMEPITHEL 5   HYALINECASTS 0       Significant Imaging: I have reviewed and interpreted all pertinent imaging results/findings within the past 24 hours.

## 2020-10-17 NOTE — PLAN OF CARE
10/17/20 1106   POLK Message   Medicare Outpatient and Observation Notification regarding financial responsibility Given to patient/caregiver;Explained to patient/caregiver;Signed/date by patient/caregiver   Date POLK was signed 10/17/20   Time POLK was signed [id number]

## 2020-10-17 NOTE — PROGRESS NOTES
Nursing Notes  Bedside handoff report received from LUBNA Kahn. Pt appears asleep. No signs of distress. Fluids infusing per MD order. Will continue to monitor. Call light in reach.       Huddle Comments

## 2020-10-17 NOTE — PLAN OF CARE
10/17/20 1111   Advance Directives (For Healthcare)   Advance Directive  (If Adv Dir status is received, view document under Adv Dir in header or Chart Review Media tab) Patient does not have Advance Directive, declines information.

## 2020-10-17 NOTE — HOSPITAL COURSE
10/17  Tmax 100.2   WBC down to 13  K is 2.6 after KCL 60meq---ordered KCL 60 TID and a Krun of 40   Nurse reports pt c/o loss of smell and taste. Covid rapid in ED was negative     10/18  AFVSS   WBC normal   K improved to 4  REmains with profuse diarrhea     10/19 day 4 rocephin for e coli sensitive UTI. Afebrile. elevated WBC resolved. covid rapid and PCR negative. Continue with decrease smell and taste. Diarrhea yesterday. Lomotil given and has not had again. Stool studies ordered but none collected as it resolved with lomotil.

## 2020-10-17 NOTE — HPI
60 year old female with 5-6 day history of dysuria fatigue, decreased appetite, vomiting, presents to ED.    Work up in ED is significant for pyelonephritis bilateral kidneys and lactate of 3.4  She is given 3 liters of NS in the ED. HR in 110s, now in the 90s  BP is 119 systolic   Temp 100.1  Placed on IV Rocephin and IVF   Blood Cx and Urine Cx pending

## 2020-10-17 NOTE — PROGRESS NOTES
[facility] Medicine  Progress Note    Patient Name: Carmen Martinez  MRN: [id number]  Patient Class: OP- Observation   Admission Date: 10/16/2020  Length of Stay: 0 days  Attending Physician: Henrique Camejo MD  Primary Care Provider: Tyrese Parmar MD        Subjective:     Principal Problem:<principal problem not specified>        HPI:  60 year old female with 5-6 day history of dysuria fatigue, decreased appetite, vomiting, presents to ED.    Work up in ED is significant for pyelonephritis bilateral kidneys and lactate of 3.4  She is given 3 liters of NS in the ED. HR in 110s, now in the 90s  BP is 119 systolic   Temp 100.1  Placed on IV Rocephin and IVF   Blood Cx and Urine Cx pending       Overview/Hospital Course:  10/17  Tmax 100.2   WBC down to 13  K is 2.6 after KCL 60meq---ordered KCL 60 TID and a Krun of 40   Nurse reports pt c/o loss of smell and taste. Covid rapid in ED was negative       Interval History: see HC     Review of Systems   Constitutional: Positive for chills, fatigue and fever.   HENT: Negative for congestion and sore throat.    Respiratory: Positive for cough and shortness of breath.    Cardiovascular: Negative for chest pain and palpitations.   Gastrointestinal: Positive for diarrhea, nausea and vomiting. Negative for abdominal pain, blood in stool and constipation.   Genitourinary: Positive for dysuria and flank pain. Negative for difficulty urinating.   Musculoskeletal: Negative for arthralgias.   Neurological: Positive for light-headedness and headaches. Negative for dizziness and syncope.   Psychiatric/Behavioral: Negative for dysphoric mood, sleep disturbance and suicidal ideas. The patient is not nervous/anxious.      Objective:     Vital Signs (Most Recent):  Temp: 98.3 °F (36.8 °C) (10/17/20 1626)  Pulse: 90 (10/17/20 1626)  Resp: 20 (10/17/20 1626)  BP: (!) 140/63 (10/17/20 1626)  SpO2: 97 % (10/17/20 1626) Vital Signs (24h Range):  Temp:  [98  °F (36.7 °C)-100.2 °F (37.9 °C)] 98.3 °F (36.8 °C)  Pulse:  [] 90  Resp:  [18-20] 20  SpO2:  [95 %-99 %] 97 %  BP: (119-140)/(61-69) 140/63     Weight: 54.5 kg (120 lb 2.4 oz)  Body mass index is 21.98 kg/m².  No intake or output data in the 24 hours ending 10/17/20 1709   Physical Exam  Constitutional:       Appearance: She is well-developed. She is not ill-appearing.      Comments: Looks much older than stated age    HENT:      Head: Normocephalic and atraumatic.      Right Ear: External ear normal.      Left Ear: External ear normal.      Nose: Nose normal.   Eyes:      Pupils: Pupils are equal, round, and reactive to light.   Neck:      Musculoskeletal: Normal range of motion and neck supple.      Thyroid: No thyromegaly.      Vascular: No JVD.      Trachea: No tracheal deviation.   Cardiovascular:      Rate and Rhythm: Normal rate.      Heart sounds: Normal heart sounds. No murmur.   Pulmonary:      Effort: Pulmonary effort is normal. No respiratory distress.      Breath sounds: Normal breath sounds. No wheezing or rales.   Chest:      Chest wall: No tenderness.   Abdominal:      General: Bowel sounds are normal. There is no distension.      Palpations: Abdomen is soft. There is no mass.      Tenderness: There is no abdominal tenderness. There is no guarding or rebound.   Musculoskeletal: Normal range of motion.         General: Tenderness present.      Comments: CVA TTP B   Lymphadenopathy:      Cervical: No cervical adenopathy.   Skin:     General: Skin is warm and dry.      Coloration: Skin is not pale.      Findings: No erythema or rash.   Neurological:      Mental Status: She is alert and oriented to person, place, and time.      Cranial Nerves: No cranial nerve deficit.      Motor: No abnormal muscle tone.      Coordination: Coordination normal.      Deep Tendon Reflexes: Reflexes are normal and symmetric. Reflexes normal.   Psychiatric:         Behavior: Behavior normal.         Thought Content:  Thought content normal.         Judgment: Judgment normal.         Significant Labs:   Blood Culture:   Recent Labs   Lab 10/16/20  1313   LABBLOO No Growth to date  No Growth to date     CBC:   Recent Labs   Lab 10/16/20  1109 10/17/20  0509   WBC 14.51* 13.04*   HGB 13.9 11.6*   HCT 39.8 34.0*    316     CMP:   Recent Labs   Lab 10/16/20  1109 10/17/20  0509   * 136   K 2.9* 2.6*   CL 92* 104   CO2 25 19*   * 89   BUN 10 6   CREATININE 0.9 0.7   CALCIUM 9.7 8.5*   PROT 7.9 6.7   ALBUMIN 3.2* 2.7*   BILITOT 0.6 0.4   ALKPHOS 92 73   AST 24 19   ALT 18 20   ANIONGAP 14 13   EGFRNONAA >60 >60     Lactic Acid:   Recent Labs   Lab 10/16/20  2108 10/17/20  0107 10/17/20  0509   LACTATE 0.7 1.2 0.7     Urine Culture: No results for input(s): LABURIN in the last 48 hours.    Significant Imaging: I have reviewed and interpreted all pertinent imaging results/findings within the past 24 hours.      Assessment/Plan:      Smell or taste sensation disturbance  In light of her diarrhea and SOB, will recheck for covid, this time a send out test, which has higher sensitivity       Sepsis without acute organ dysfunction  3 liters NS in ED   NS at 125/hour   Lactic q 4 til normal   Continue rocephin   BP and HR now normal     Pyelonephritis  IV Rocehpin  IVF  Blood and Urine Cx       Chronic pain  Lex, TCA, and norco ordered as she takes at home       Tobacco abuse  Denies need for nicotine patch         VTE Risk Mitigation (From admission, onward)         Ordered     IP VTE LOW RISK PATIENT  Once      10/16/20 1826     Place sequential compression device  Until discontinued      10/16/20 1826                Discharge Planning   YOAV:      Code Status: Full Code   Is the patient medically ready for discharge?:     Reason for patient still in hospital (select all that apply): Patient trending condition and Treatment  Discharge Plan A: Home with family                  Henrique Camejo MD  Department of Hospital  Medicine   [facility]

## 2020-10-17 NOTE — PLAN OF CARE
CM completed discharge assessment with patient at bedside. CM introduced self and role. Patient states she lives at home with spouse and is independent in daily living. Patient denies any needs for discharge at this time. CM educated patient on dx and treatment, along with signs and symptoms to look for at discharge that would warrant a return to hospital. Patient states she does not have taste, can not smell, and has been having diarrhea, which she describes as straight liquid' currently. I educated her this would have to be discussed with MD. CM contact information given, and CM white board updated. Patient encouraged to call with any questions.     CM approached Dr. Camejo to make aware of the above.      10/17/20 1107   Discharge Assessment   Assessment Type Discharge Planning Assessment   Confirmed/corrected address and phone number on facesheet? Yes   Assessment information obtained from? Patient   Expected Length of Stay (days) 2   Communicated expected length of stay with patient/caregiver yes   Prior to hospitilization cognitive status: Alert/Oriented   Prior to hospitalization functional status: Independent   Current cognitive status: Alert/Oriented   Current Functional Status: Independent   Facility Arrived From: home   Lives With spouse   Able to Return to Prior Arrangements yes   Is patient able to care for self after discharge? Yes   Who are your caregiver(s) and their phone number(s)? [place], spouse [phone]   Patient's perception of discharge disposition home or selfcare   Readmission Within the Last 30 Days no previous admission in last 30 days   Patient currently being followed by outpatient case management? No   Patient currently receives any other outside agency services? No   Equipment Currently Used at Home none   Part D Coverage [employer] medicare   Do you have any problems affording any of your prescribed medications? No   Is the patient taking medications as prescribed? yes   Does the  patient have transportation home? Yes   Transportation Anticipated family or friend will provide   Discharge Plan A Home with family   Discharge Plan B Home Health   DME Needed Upon Discharge  none   Patient/Family in Agreement with Plan yes

## 2020-10-17 NOTE — PLAN OF CARE
Patient is compliant with fluid and medication management. Tolerating IV fluids well. Patient is understanding and compliant with lab draws and procedures. Lactic level has trended down. Patient is free from falls and injury. VSS. Plan of care reviewed and agreed upon with patient. Will continue to monitor.

## 2020-10-17 NOTE — ASSESSMENT & PLAN NOTE
3 liters NS in ED   NS at 125/hour   Lactic q 4 til normal   Continue rocephin   BP and HR now normal

## 2020-10-17 NOTE — SUBJECTIVE & OBJECTIVE
Interval History: see      Review of Systems   Constitutional: Positive for chills, fatigue and fever.   HENT: Negative for congestion and sore throat.    Respiratory: Positive for cough and shortness of breath.    Cardiovascular: Negative for chest pain and palpitations.   Gastrointestinal: Positive for diarrhea, nausea and vomiting. Negative for abdominal pain, blood in stool and constipation.   Genitourinary: Positive for dysuria and flank pain. Negative for difficulty urinating.   Musculoskeletal: Negative for arthralgias.   Neurological: Positive for light-headedness and headaches. Negative for dizziness and syncope.   Psychiatric/Behavioral: Negative for dysphoric mood, sleep disturbance and suicidal ideas. The patient is not nervous/anxious.      Objective:     Vital Signs (Most Recent):  Temp: 98.3 °F (36.8 °C) (10/17/20 1626)  Pulse: 90 (10/17/20 1626)  Resp: 20 (10/17/20 1626)  BP: (!) 140/63 (10/17/20 1626)  SpO2: 97 % (10/17/20 1626) Vital Signs (24h Range):  Temp:  [98 °F (36.7 °C)-100.2 °F (37.9 °C)] 98.3 °F (36.8 °C)  Pulse:  [] 90  Resp:  [18-20] 20  SpO2:  [95 %-99 %] 97 %  BP: (119-140)/(61-69) 140/63     Weight: 54.5 kg (120 lb 2.4 oz)  Body mass index is 21.98 kg/m².  No intake or output data in the 24 hours ending 10/17/20 1709   Physical Exam  Constitutional:       Appearance: She is well-developed. She is not ill-appearing.      Comments: Looks much older than stated age    HENT:      Head: Normocephalic and atraumatic.      Right Ear: External ear normal.      Left Ear: External ear normal.      Nose: Nose normal.   Eyes:      Pupils: Pupils are equal, round, and reactive to light.   Neck:      Musculoskeletal: Normal range of motion and neck supple.      Thyroid: No thyromegaly.      Vascular: No JVD.      Trachea: No tracheal deviation.   Cardiovascular:      Rate and Rhythm: Normal rate.      Heart sounds: Normal heart sounds. No murmur.   Pulmonary:      Effort: Pulmonary effort  is normal. No respiratory distress.      Breath sounds: Normal breath sounds. No wheezing or rales.   Chest:      Chest wall: No tenderness.   Abdominal:      General: Bowel sounds are normal. There is no distension.      Palpations: Abdomen is soft. There is no mass.      Tenderness: There is no abdominal tenderness. There is no guarding or rebound.   Musculoskeletal: Normal range of motion.         General: Tenderness present.      Comments: CVA TTP B   Lymphadenopathy:      Cervical: No cervical adenopathy.   Skin:     General: Skin is warm and dry.      Coloration: Skin is not pale.      Findings: No erythema or rash.   Neurological:      Mental Status: She is alert and oriented to person, place, and time.      Cranial Nerves: No cranial nerve deficit.      Motor: No abnormal muscle tone.      Coordination: Coordination normal.      Deep Tendon Reflexes: Reflexes are normal and symmetric. Reflexes normal.   Psychiatric:         Behavior: Behavior normal.         Thought Content: Thought content normal.         Judgment: Judgment normal.         Significant Labs:   Blood Culture:   Recent Labs   Lab 10/16/20  1313   LABBLOO No Growth to date  No Growth to date     CBC:   Recent Labs   Lab 10/16/20  1109 10/17/20  0509   WBC 14.51* 13.04*   HGB 13.9 11.6*   HCT 39.8 34.0*    316     CMP:   Recent Labs   Lab 10/16/20  1109 10/17/20  0509   * 136   K 2.9* 2.6*   CL 92* 104   CO2 25 19*   * 89   BUN 10 6   CREATININE 0.9 0.7   CALCIUM 9.7 8.5*   PROT 7.9 6.7   ALBUMIN 3.2* 2.7*   BILITOT 0.6 0.4   ALKPHOS 92 73   AST 24 19   ALT 18 20   ANIONGAP 14 13   EGFRNONAA >60 >60     Lactic Acid:   Recent Labs   Lab 10/16/20  2108 10/17/20  0107 10/17/20  0509   LACTATE 0.7 1.2 0.7     Urine Culture: No results for input(s): LABURIN in the last 48 hours.    Significant Imaging: I have reviewed and interpreted all pertinent imaging results/findings within the past 24 hours.

## 2020-10-17 NOTE — ASSESSMENT & PLAN NOTE
In light of her diarrhea and SOB, will recheck for covid, this time a send out test, which has higher sensitivity

## 2020-10-17 NOTE — H&P
[facility] Medicine  History & Physical    Patient Name: Carmen Martinez  MRN: [id number]  Admission Date: 10/16/2020  Attending Physician: Hnerique Camejo MD   Primary Care Provider: Tyrese Parmar MD         Patient information was obtained from patient and ER records.     Subjective:     Principal Problem:<principal problem not specified>    Chief Complaint:   Chief Complaint   Patient presents with    Emesis        HPI: 60 year old female with 5-6 day history of dysuria fatigue, decreased appetite, vomiting, presents to ED.    Work up in ED is significant for pyelonephritis bilateral kidneys and lactate of 3.4  She is given 3 liters of NS in the ED. HR in 110s, now in the 90s  BP is 119 systolic   Temp 100.1  Placed on IV Rocephin and IVF   Blood Cx and Urine Cx pending       Past Medical History:   Diagnosis Date    Back pain     Bulging disc     Common bile duct dilation     Hepatitis     HCV-tx' w/ Harvoni 3/2018    Neck pain        Past Surgical History:   Procedure Laterality Date    COLONOSCOPY  2014       Review of patient's allergies indicates:   Allergen Reactions    Codeine Itching       No current facility-administered medications on file prior to encounter.      Current Outpatient Medications on File Prior to Encounter   Medication Sig    ACETAMINOPHEN (TYLENOL EXTRA STRENGTH ORAL) Take by mouth.    amitriptyline (ELAVIL) 50 MG tablet Take 50 mg by mouth every evening.    gabapentin (NEURONTIN) 300 MG capsule Take 1 capsule (300 mg total) by mouth 3 (three) times daily.    hydrocodone-acetaminophen 10-325mg (NORCO)  mg Tab Take 1 tablet by mouth 4 (four) times daily as needed.    [DISCONTINUED] ascorbic acid (VITAMIN C) 500 MG tablet Take 500 mg by mouth once daily.    [DISCONTINUED] cranberry 500 mg Cap Take 1 capsule by mouth once daily.    [DISCONTINUED] cyclobenzaprine (FLEXERIL) 5 MG tablet TAKE 1 AND 1/2 TABLETS BY MOUTH THREE TIMES DAILY  AS NEEDED    [DISCONTINUED] fish oil-omega-3 fatty acids 300-1,000 mg capsule Take 1 g by mouth once daily.    [DISCONTINUED] garlic 1,000 mg Cap Take 1 tablet by mouth once daily.    [DISCONTINUED] methylPREDNISolone (MEDROL DOSEPACK) 4 mg tablet Pack as directed    [DISCONTINUED] omeprazole (PRILOSEC) 20 MG capsule Take 2 capsules (40 mg total) by mouth once daily.    [DISCONTINUED] vitamin E 400 UNIT capsule Take 400 Units by mouth once daily.     Family History     Problem Relation (Age of Onset)    Heart disease Mother        Tobacco Use    Smoking status: Current Every Day Smoker     Packs/day: 1.00     Years: 40.00     Pack years: 40.00     Types: Cigarettes     Start date: 9/21/1976    Smokeless tobacco: Never Used   Substance and Sexual Activity    Alcohol use: No     Comment: heavy until 10 years ago    Drug use: No     Comment: NANDO in 80s    Sexual activity: Never     Review of Systems   Constitutional: Positive for chills, fatigue and fever.   HENT: Negative for congestion and sore throat.    Respiratory: Positive for cough and shortness of breath.    Cardiovascular: Negative for chest pain and palpitations.   Gastrointestinal: Positive for diarrhea, nausea and vomiting. Negative for abdominal pain, blood in stool and constipation.   Genitourinary: Positive for dysuria and flank pain. Negative for difficulty urinating.   Musculoskeletal: Negative for arthralgias.   Neurological: Positive for light-headedness and headaches. Negative for dizziness and syncope.   Psychiatric/Behavioral: Negative for dysphoric mood, sleep disturbance and suicidal ideas. The patient is not nervous/anxious.      Objective:     Vital Signs (Most Recent):  Temp: 100.1 °F (37.8 °C) (10/16/20 1832)  Pulse: 97 (10/16/20 1832)  Resp: 20 (10/16/20 1832)  BP: 119/64 (10/16/20 1832)  SpO2: 100 % (10/16/20 1621) Vital Signs (24h Range):  Temp:  [96.9 °F (36.1 °C)-100.1 °F (37.8 °C)] 100.1 °F (37.8 °C)  Pulse:  []  97  Resp:  [20-22] 20  SpO2:  [98 %-100 %] 100 %  BP: (103-125)/(60-69) 119/64     Weight: 54.5 kg (120 lb 2.4 oz)  Body mass index is 21.98 kg/m².    Physical Exam  Constitutional:       Appearance: She is well-developed. She is not ill-appearing.      Comments: Looks much older than stated age    HENT:      Head: Normocephalic and atraumatic.      Right Ear: External ear normal.      Left Ear: External ear normal.      Nose: Nose normal.   Eyes:      Pupils: Pupils are equal, round, and reactive to light.   Neck:      Musculoskeletal: Normal range of motion and neck supple.      Thyroid: No thyromegaly.      Vascular: No JVD.      Trachea: No tracheal deviation.   Cardiovascular:      Rate and Rhythm: Normal rate.      Heart sounds: Normal heart sounds. No murmur.   Pulmonary:      Effort: Pulmonary effort is normal. No respiratory distress.      Breath sounds: Normal breath sounds. No wheezing or rales.   Chest:      Chest wall: No tenderness.   Abdominal:      General: Bowel sounds are normal. There is no distension.      Palpations: Abdomen is soft. There is no mass.      Tenderness: There is no abdominal tenderness. There is no guarding or rebound.   Musculoskeletal: Normal range of motion.         General: Tenderness present.      Comments: CVA TTP B   Lymphadenopathy:      Cervical: No cervical adenopathy.   Skin:     General: Skin is warm and dry.      Coloration: Skin is not pale.      Findings: No erythema or rash.   Neurological:      Mental Status: She is alert and oriented to person, place, and time.      Cranial Nerves: No cranial nerve deficit.      Motor: No abnormal muscle tone.      Coordination: Coordination normal.      Deep Tendon Reflexes: Reflexes are normal and symmetric. Reflexes normal.   Psychiatric:         Behavior: Behavior normal.         Thought Content: Thought content normal.         Judgment: Judgment normal.           CRANIAL NERVES     CN III, IV, VI   Pupils are equal, round,  and reactive to light.       Significant Labs:   Blood Culture: No results for input(s): LABBLOO in the last 48 hours.  CBC:   Recent Labs   Lab 10/16/20  1109   WBC 14.51*   HGB 13.9   HCT 39.8        CMP:   Recent Labs   Lab 10/16/20  1109   *   K 2.9*   CL 92*   CO2 25   *   BUN 10   CREATININE 0.9   CALCIUM 9.7   PROT 7.9   ALBUMIN 3.2*   BILITOT 0.6   ALKPHOS 92   AST 24   ALT 18   ANIONGAP 14   EGFRNONAA >60     Lactic Acid:   Recent Labs   Lab 10/16/20  1313 10/16/20  1552   LACTATE 2.7* 3.4*     Urine Culture: No results for input(s): LABURIN in the last 48 hours.  Urine Studies:   Recent Labs   Lab 10/16/20  1212   COLORU Yellow   APPEARANCEUA Clear   PHUR 7.0   SPECGRAV 1.010   PROTEINUA 1+*   GLUCUA Negative   KETONESU Negative   BILIRUBINUA Negative   OCCULTUA 2+*   NITRITE Negative   UROBILINOGEN Negative   LEUKOCYTESUR 2+*   RBCUA 10*   WBCUA >100*   BACTERIA Moderate*   SQUAMEPITHEL 5   HYALINECASTS 0       Significant Imaging: I have reviewed and interpreted all pertinent imaging results/findings within the past 24 hours.    Assessment/Plan:     Sepsis without acute organ dysfunction  3 liters NS in ED   NS at 125/hour   Lactic q 4 til normal   Continue rocephin   BP and HR now normal     Pyelonephritis  IV Rocehpin  IVF  Blood and Urine Cx       Chronic pain  Lex, TCA, and norco ordered as she takes at home       Tobacco abuse  Denies need for nicotine patch         VTE Risk Mitigation (From admission, onward)         Ordered     IP VTE LOW RISK PATIENT  Once      10/16/20 1826     Place sequential compression device  Until discontinued      10/16/20 1826                   Henrique Camejo MD  Department of Hospital Medicine   [facility]

## 2020-10-18 LAB
ANION GAP SERPL CALC-SCNC: 12 MMOL/L (ref 8–16)
BACTERIA UR CULT: ABNORMAL
BASOPHILS # BLD AUTO: 0.01 K/UL (ref 0–0.2)
BASOPHILS NFR BLD: 0.1 % (ref 0–1.9)
BUN SERPL-MCNC: 6 MG/DL (ref 6–20)
CALCIUM SERPL-MCNC: 9.1 MG/DL (ref 8.7–10.5)
CHLORIDE SERPL-SCNC: 108 MMOL/L (ref 95–110)
CO2 SERPL-SCNC: 18 MMOL/L (ref 23–29)
CREAT SERPL-MCNC: 0.7 MG/DL (ref 0.5–1.4)
DIFFERENTIAL METHOD: ABNORMAL
EOSINOPHIL # BLD AUTO: 0.1 K/UL (ref 0–0.5)
EOSINOPHIL NFR BLD: 0.5 % (ref 0–8)
ERYTHROCYTE [DISTWIDTH] IN BLOOD BY AUTOMATED COUNT: 12.7 % (ref 11.5–14.5)
EST. GFR  (AFRICAN AMERICAN): >60 ML/MIN/1.73 M^2
EST. GFR  (NON AFRICAN AMERICAN): >60 ML/MIN/1.73 M^2
GLUCOSE SERPL-MCNC: 111 MG/DL (ref 70–110)
HCT VFR BLD AUTO: 36.4 % (ref 37–48.5)
HGB BLD-MCNC: 12 G/DL (ref 12–16)
IMM GRANULOCYTES # BLD AUTO: 0.09 K/UL (ref 0–0.04)
IMM GRANULOCYTES NFR BLD AUTO: 0.9 % (ref 0–0.5)
LYMPHOCYTES # BLD AUTO: 1.6 K/UL (ref 1–4.8)
LYMPHOCYTES NFR BLD: 15.8 % (ref 18–48)
MCH RBC QN AUTO: 30.6 PG (ref 27–31)
MCHC RBC AUTO-ENTMCNC: 33 G/DL (ref 32–36)
MCV RBC AUTO: 93 FL (ref 82–98)
MONOCYTES # BLD AUTO: 1.1 K/UL (ref 0.3–1)
MONOCYTES NFR BLD: 10.3 % (ref 4–15)
NEUTROPHILS # BLD AUTO: 7.4 K/UL (ref 1.8–7.7)
NEUTROPHILS NFR BLD: 72.4 % (ref 38–73)
NRBC BLD-RTO: 0 /100 WBC
PLATELET # BLD AUTO: 403 K/UL (ref 150–350)
PMV BLD AUTO: 9.9 FL (ref 9.2–12.9)
POTASSIUM SERPL-SCNC: 3.8 MMOL/L (ref 3.5–5.1)
RBC # BLD AUTO: 3.92 M/UL (ref 4–5.4)
SARS-COV-2 RNA RESP QL NAA+PROBE: NOT DETECTED
SODIUM SERPL-SCNC: 138 MMOL/L (ref 136–145)
WBC # BLD AUTO: 10.17 K/UL (ref 3.9–12.7)

## 2020-10-18 PROCEDURE — 63600175 PHARM REV CODE 636 W HCPCS: Performed by: FAMILY MEDICINE

## 2020-10-18 PROCEDURE — 25000003 PHARM REV CODE 250: Performed by: FAMILY MEDICINE

## 2020-10-18 PROCEDURE — 36415 COLL VENOUS BLD VENIPUNCTURE: CPT

## 2020-10-18 PROCEDURE — 99232 SBSQ HOSP IP/OBS MODERATE 35: CPT | Mod: ,,, | Performed by: FAMILY MEDICINE

## 2020-10-18 PROCEDURE — 96361 HYDRATE IV INFUSION ADD-ON: CPT

## 2020-10-18 PROCEDURE — 85025 COMPLETE CBC W/AUTO DIFF WBC: CPT

## 2020-10-18 PROCEDURE — 96376 TX/PRO/DX INJ SAME DRUG ADON: CPT

## 2020-10-18 PROCEDURE — 80048 BASIC METABOLIC PNL TOTAL CA: CPT

## 2020-10-18 PROCEDURE — 99232 PR SUBSEQUENT HOSPITAL CARE,LEVL II: ICD-10-PCS | Mod: ,,, | Performed by: FAMILY MEDICINE

## 2020-10-18 PROCEDURE — 11000001 HC ACUTE MED/SURG PRIVATE ROOM

## 2020-10-18 RX ORDER — DIPHENOXYLATE HYDROCHLORIDE AND ATROPINE SULFATE 2.5; .025 MG/1; MG/1
1 TABLET ORAL 4 TIMES DAILY PRN
Status: DISCONTINUED | OUTPATIENT
Start: 2020-10-18 | End: 2020-10-19 | Stop reason: HOSPADM

## 2020-10-18 RX ADMIN — GABAPENTIN 300 MG: 300 CAPSULE ORAL at 08:10

## 2020-10-18 RX ADMIN — DIPHENOXYLATE HYDROCHLORIDE AND ATROPINE SULFATE 1 TABLET: 2.5; .025 TABLET ORAL at 11:10

## 2020-10-18 RX ADMIN — CEFTRIAXONE 2 G: 2 INJECTION, SOLUTION INTRAVENOUS at 03:10

## 2020-10-18 RX ADMIN — SODIUM CHLORIDE: 0.9 INJECTION, SOLUTION INTRAVENOUS at 08:10

## 2020-10-18 RX ADMIN — AMITRIPTYLINE HYDROCHLORIDE 50 MG: 25 TABLET, FILM COATED ORAL at 08:10

## 2020-10-18 RX ADMIN — POTASSIUM CHLORIDE 40 MEQ: 1500 TABLET, EXTENDED RELEASE ORAL at 08:10

## 2020-10-18 RX ADMIN — SODIUM CHLORIDE: 0.9 INJECTION, SOLUTION INTRAVENOUS at 12:10

## 2020-10-18 RX ADMIN — SODIUM CHLORIDE: 0.9 INJECTION, SOLUTION INTRAVENOUS at 03:10

## 2020-10-18 RX ADMIN — GABAPENTIN 300 MG: 300 CAPSULE ORAL at 03:10

## 2020-10-18 RX ADMIN — ACETAMINOPHEN 650 MG: 325 TABLET ORAL at 04:10

## 2020-10-18 NOTE — SUBJECTIVE & OBJECTIVE
Interval History: see      Review of Systems   Constitutional: Negative for fever.   Cardiovascular: Negative for chest pain.   Gastrointestinal: Positive for diarrhea.     Objective:     Vital Signs (Most Recent):  Temp: 97.7 °F (36.5 °C) (10/18/20 1109)  Pulse: 85 (10/18/20 1109)  Resp: 18 (10/18/20 1109)  BP: (!) 140/68 (10/18/20 1109)  SpO2: 100 % (10/18/20 1109) Vital Signs (24h Range):  Temp:  [97.6 °F (36.4 °C)-99.1 °F (37.3 °C)] 97.7 °F (36.5 °C)  Pulse:  [83-94] 85  Resp:  [18-20] 18  SpO2:  [97 %-100 %] 100 %  BP: (118-140)/(63-77) 140/68     Weight: 54.5 kg (120 lb 2.4 oz)  Body mass index is 21.98 kg/m².    Intake/Output Summary (Last 24 hours) at 10/18/2020 1356  Last data filed at 10/18/2020 0900  Gross per 24 hour   Intake 240 ml   Output --   Net 240 ml      Physical Exam  Constitutional:       Appearance: She is well-developed. She is not ill-appearing.      Comments: Looks much older than stated age    HENT:      Head: Normocephalic and atraumatic.      Right Ear: External ear normal.      Left Ear: External ear normal.      Nose: Nose normal.   Eyes:      Pupils: Pupils are equal, round, and reactive to light.   Neck:      Musculoskeletal: Normal range of motion and neck supple.      Thyroid: No thyromegaly.      Vascular: No JVD.      Trachea: No tracheal deviation.   Cardiovascular:      Rate and Rhythm: Normal rate.      Heart sounds: Normal heart sounds. No murmur.   Pulmonary:      Effort: Pulmonary effort is normal. No respiratory distress.      Breath sounds: Normal breath sounds. No wheezing or rales.   Chest:      Chest wall: No tenderness.   Abdominal:      General: Bowel sounds are normal. There is no distension.      Palpations: Abdomen is soft. There is no mass.      Tenderness: There is no abdominal tenderness. There is no guarding or rebound.   Musculoskeletal: Normal range of motion.         General: Tenderness present.      Comments: CVA TTP  Improved    Lymphadenopathy:       Cervical: No cervical adenopathy.   Skin:     General: Skin is warm and dry.      Coloration: Skin is not pale.      Findings: No erythema or rash.   Neurological:      Mental Status: She is alert and oriented to person, place, and time.      Cranial Nerves: No cranial nerve deficit.      Motor: No abnormal muscle tone.      Coordination: Coordination normal.      Deep Tendon Reflexes: Reflexes are normal and symmetric. Reflexes normal.   Psychiatric:         Behavior: Behavior normal.         Thought Content: Thought content normal.         Judgment: Judgment normal.         Significant Labs:   CBC:   Recent Labs   Lab 10/17/20  0509 10/18/20  1330   WBC 13.04* 10.17   HGB 11.6* 12.0   HCT 34.0* 36.4*    403*     CMP:   Recent Labs   Lab 10/17/20  0509 10/17/20  1702    137   K 2.6* 4.0    106   CO2 19* 16*   GLU 89 99   BUN 6 6   CREATININE 0.7 0.7   CALCIUM 8.5* 9.0   PROT 6.7  --    ALBUMIN 2.7*  --    BILITOT 0.4  --    ALKPHOS 73  --    AST 19  --    ALT 20  --    ANIONGAP 13 15   EGFRNONAA >60 >60       Significant Imaging: I have reviewed and interpreted all pertinent imaging results/findings within the past 24 hours.

## 2020-10-18 NOTE — ASSESSMENT & PLAN NOTE
In light of her diarrhea and SOB, will recheck for covid, this time a send out test, which has higher sensitivity--- this is negative as well

## 2020-10-18 NOTE — PLAN OF CARE
Patient resting with no issues at this time. Independent in room, free from falls/injury. IV fluids administered as ordered. VS stable. A/Ox4. No acute changes noted. Plan of care reviewed and agreed upon.

## 2020-10-18 NOTE — PROGRESS NOTES
Nursing Notes  Bedside handoff report received from LUBNA De La Cruz. Pt appears asleep. No signs of distress. Fluids infusing per MD order. Will continue to monitor. Call light in reach.       Huddle Comments

## 2020-10-18 NOTE — PROGRESS NOTES
[facility] Medicine  Progress Note    Patient Name: Carmen Martinez  MRN: [id number]  Patient Class: OP- Observation   Admission Date: 10/16/2020  Length of Stay: 0 days  Attending Physician: Henrique Camejo MD  Primary Care Provider: Tyrese Parmar MD        Subjective:     Principal Problem:<principal problem not specified>        HPI:  60 year old female with 5-6 day history of dysuria fatigue, decreased appetite, vomiting, presents to ED.    Work up in ED is significant for pyelonephritis bilateral kidneys and lactate of 3.4  She is given 3 liters of NS in the ED. HR in 110s, now in the 90s  BP is 119 systolic   Temp 100.1  Placed on IV Rocephin and IVF   Blood Cx and Urine Cx pending       Overview/Hospital Course:  10/17  Tmax 100.2   WBC down to 13  K is 2.6 after KCL 60meq---ordered KCL 60 TID and a Krun of 40   Nurse reports pt c/o loss of smell and taste. Covid rapid in ED was negative     10/18  AFVSS   WBC normal   K improved to 4  REmains with profuse diarrhea       Interval History: see HC     Review of Systems   Constitutional: Negative for fever.   Cardiovascular: Negative for chest pain.   Gastrointestinal: Positive for diarrhea.     Objective:     Vital Signs (Most Recent):  Temp: 97.7 °F (36.5 °C) (10/18/20 1109)  Pulse: 85 (10/18/20 1109)  Resp: 18 (10/18/20 1109)  BP: (!) 140/68 (10/18/20 1109)  SpO2: 100 % (10/18/20 1109) Vital Signs (24h Range):  Temp:  [97.6 °F (36.4 °C)-99.1 °F (37.3 °C)] 97.7 °F (36.5 °C)  Pulse:  [83-94] 85  Resp:  [18-20] 18  SpO2:  [97 %-100 %] 100 %  BP: (118-140)/(63-77) 140/68     Weight: 54.5 kg (120 lb 2.4 oz)  Body mass index is 21.98 kg/m².    Intake/Output Summary (Last 24 hours) at 10/18/2020 1356  Last data filed at 10/18/2020 0900  Gross per 24 hour   Intake 240 ml   Output --   Net 240 ml      Physical Exam  Constitutional:       Appearance: She is well-developed. She is not ill-appearing.      Comments: Looks much older  than stated age    HENT:      Head: Normocephalic and atraumatic.      Right Ear: External ear normal.      Left Ear: External ear normal.      Nose: Nose normal.   Eyes:      Pupils: Pupils are equal, round, and reactive to light.   Neck:      Musculoskeletal: Normal range of motion and neck supple.      Thyroid: No thyromegaly.      Vascular: No JVD.      Trachea: No tracheal deviation.   Cardiovascular:      Rate and Rhythm: Normal rate.      Heart sounds: Normal heart sounds. No murmur.   Pulmonary:      Effort: Pulmonary effort is normal. No respiratory distress.      Breath sounds: Normal breath sounds. No wheezing or rales.   Chest:      Chest wall: No tenderness.   Abdominal:      General: Bowel sounds are normal. There is no distension.      Palpations: Abdomen is soft. There is no mass.      Tenderness: There is no abdominal tenderness. There is no guarding or rebound.   Musculoskeletal: Normal range of motion.         General: Tenderness present.      Comments: CVA TTP  Improved    Lymphadenopathy:      Cervical: No cervical adenopathy.   Skin:     General: Skin is warm and dry.      Coloration: Skin is not pale.      Findings: No erythema or rash.   Neurological:      Mental Status: She is alert and oriented to person, place, and time.      Cranial Nerves: No cranial nerve deficit.      Motor: No abnormal muscle tone.      Coordination: Coordination normal.      Deep Tendon Reflexes: Reflexes are normal and symmetric. Reflexes normal.   Psychiatric:         Behavior: Behavior normal.         Thought Content: Thought content normal.         Judgment: Judgment normal.         Significant Labs:   CBC:   Recent Labs   Lab 10/17/20  0509 10/18/20  1330   WBC 13.04* 10.17   HGB 11.6* 12.0   HCT 34.0* 36.4*    403*     CMP:   Recent Labs   Lab 10/17/20  0509 10/17/20  1702    137   K 2.6* 4.0    106   CO2 19* 16*   GLU 89 99   BUN 6 6   CREATININE 0.7 0.7   CALCIUM 8.5* 9.0   PROT 6.7  --     ALBUMIN 2.7*  --    BILITOT 0.4  --    ALKPHOS 73  --    AST 19  --    ALT 20  --    ANIONGAP 13 15   EGFRNONAA >60 >60       Significant Imaging: I have reviewed and interpreted all pertinent imaging results/findings within the past 24 hours.      Assessment/Plan:      Smell or taste sensation disturbance  In light of her diarrhea and SOB, will recheck for covid, this time a send out test, which has higher sensitivity--- this is negative as well       Sepsis without acute organ dysfunction  3 liters NS in ED   NS at 125/hour   Lactic q 4 til normal   Continue rocephin   BP and HR now normal     Pyelonephritis  IV Rocehpin  IVF  Blood and Urine Cx       Chronic pain  Lex, TCA, and norco ordered as she takes at home       Tobacco abuse  Denies need for nicotine patch         VTE Risk Mitigation (From admission, onward)         Ordered     IP VTE LOW RISK PATIENT  Once      10/16/20 1826     Place sequential compression device  Until discontinued      10/16/20 1826                Discharge Planning   YOAV:      Code Status: Full Code   Is the patient medically ready for discharge?:     Reason for patient still in hospital (select all that apply): Treatment  Discharge Plan A: Home with family                  Henrique Camejo MD  Department of Hospital Medicine   [facility]

## 2020-10-19 VITALS
HEIGHT: 62 IN | BODY MASS INDEX: 22.11 KG/M2 | OXYGEN SATURATION: 100 % | DIASTOLIC BLOOD PRESSURE: 82 MMHG | TEMPERATURE: 98 F | RESPIRATION RATE: 18 BRPM | SYSTOLIC BLOOD PRESSURE: 141 MMHG | WEIGHT: 120.13 LBS | HEART RATE: 84 BPM

## 2020-10-19 PROBLEM — R79.89 POSITIVE D DIMER: Status: ACTIVE | Noted: 2020-10-19

## 2020-10-19 PROCEDURE — 99238 PR HOSPITAL DISCHARGE DAY,<30 MIN: ICD-10-PCS | Mod: ,,, | Performed by: FAMILY MEDICINE

## 2020-10-19 PROCEDURE — 99238 HOSP IP/OBS DSCHRG MGMT 30/<: CPT | Mod: ,,, | Performed by: FAMILY MEDICINE

## 2020-10-19 PROCEDURE — 25000003 PHARM REV CODE 250: Performed by: FAMILY MEDICINE

## 2020-10-19 RX ORDER — SULFAMETHOXAZOLE AND TRIMETHOPRIM 800; 160 MG/1; MG/1
1 TABLET ORAL 2 TIMES DAILY
Qty: 14 TABLET | Refills: 0 | Status: SHIPPED | OUTPATIENT
Start: 2020-10-19

## 2020-10-19 RX ORDER — SULFAMETHOXAZOLE AND TRIMETHOPRIM 800; 160 MG/1; MG/1
1 TABLET ORAL 2 TIMES DAILY
Qty: 14 TABLET | Refills: 0 | Status: SHIPPED | OUTPATIENT
Start: 2020-10-19 | End: 2020-10-19 | Stop reason: SDUPTHER

## 2020-10-19 RX ORDER — DIPHENOXYLATE HYDROCHLORIDE AND ATROPINE SULFATE 2.5; .025 MG/1; MG/1
1 TABLET ORAL 4 TIMES DAILY PRN
Qty: 12 TABLET | Refills: 0 | Status: SHIPPED | OUTPATIENT
Start: 2020-10-19 | End: 2020-10-29

## 2020-10-19 RX ORDER — DIPHENOXYLATE HYDROCHLORIDE AND ATROPINE SULFATE 2.5; .025 MG/1; MG/1
1 TABLET ORAL 4 TIMES DAILY PRN
Qty: 12 TABLET | Refills: 0 | Status: SHIPPED | OUTPATIENT
Start: 2020-10-19 | End: 2020-10-19

## 2020-10-19 RX ADMIN — SODIUM CHLORIDE: 0.9 INJECTION, SOLUTION INTRAVENOUS at 08:10

## 2020-10-19 RX ADMIN — POTASSIUM CHLORIDE 40 MEQ: 1500 TABLET, EXTENDED RELEASE ORAL at 08:10

## 2020-10-19 RX ADMIN — GABAPENTIN 300 MG: 300 CAPSULE ORAL at 08:10

## 2020-10-19 RX ADMIN — SODIUM CHLORIDE: 0.9 INJECTION, SOLUTION INTRAVENOUS at 06:10

## 2020-10-19 NOTE — DISCHARGE SUMMARY
[facility] Medicine  Discharge Summary      Patient Name: Carmen Martinez  MRN: [id number]  Admission Date: 10/16/2020  Hospital Length of Stay: 1 days  Discharge Date and Time:  10/19/2020 11:06 AM  Attending Physician: Henrique Camejo MD   Discharging Provider: Susan Hartley NP  Primary Care Provider: Tyrese Parmar MD      HPI:   60 year old female with 5-6 day history of dysuria fatigue, decreased appetite, vomiting, presents to ED.    Work up in ED is significant for pyelonephritis bilateral kidneys and lactate of 3.4  She is given 3 liters of NS in the ED. HR in 110s, now in the 90s  BP is 119 systolic   Temp 100.1  Placed on IV Rocephin and IVF   Blood Cx and Urine Cx pending       * No surgery found *      Hospital Course:   10/17  Tmax 100.2   WBC down to 13  K is 2.6 after KCL 60meq---ordered KCL 60 TID and a Krun of 40   Nurse reports pt c/o loss of smell and taste. Covid rapid in ED was negative     10/18  AFVSS   WBC normal   K improved to 4  REmains with profuse diarrhea     10/19 day 4 rocephin for e coli sensitive UTI. Afebrile. elevated WBC resolved. covid rapid and PCR negative. Continue with decrease smell and taste. Diarrhea yesterday. Lomotil given and has not had again. Stool studies ordered but none collected as it resolved with lomotil.      Consults:     * Pyelonephritis  IV Rocehpin  IVF  Blood and Urine Cx reviewed  Blood cultures NGTD and urine sensitive to rocephin day 4 and multiple po options      Positive D dimer  CTA in ER negative for PE      Smell or taste sensation disturbance  In light of her diarrhea and SOB, will recheck for covid, this time a send out test, which has higher sensitivity--- this is negative as well       Sepsis without acute organ dysfunction  3 liters NS in ED   NS at 125/hour   Lactic q 4 til normal   Continue rocephin   BP and HR now normal     Chronic pain  Lex, TCA, and norco ordered as she takes at home       Tobacco  abuse  Denies need for nicotine patch         Final Active Diagnoses:    Diagnosis Date Noted POA    PRINCIPAL PROBLEM:  Pyelonephritis [N12] 10/16/2020 Yes    Positive D dimer [R79.89] 10/19/2020 Yes    Smell or taste sensation disturbance [R43.9] 10/17/2020 Yes    Sepsis without acute organ dysfunction [A41.9] 10/16/2020 Yes    Tobacco abuse [Z72.0] 11/18/2014 Yes    Chronic pain [G89.29] 11/18/2014 Yes      Problems Resolved During this Admission:       Discharged Condition: good    Disposition: Home or Self Care    Follow Up:  Follow-up Information     Tyrese Parmar MD.    Specialty: Internal Medicine  Contact information:  [address] [id number] [phone]                 Patient Instructions:   No discharge procedures on file.    Significant Diagnostic Studies:     covid rapid and PCR -- negative   CBC:   Recent Labs   Lab 10/18/20  1330   WBC 10.17   HGB 12.0   HCT 36.4*   *     CMP:   Recent Labs   Lab 10/17/20  1702 10/18/20  1330    138   K 4.0 3.8    108   CO2 16* 18*   GLU 99 111*   BUN 6 6   CREATININE 0.7 0.7   CALCIUM 9.0 9.1   ANIONGAP 15 12   EGFRNONAA >60 >60     10/17 lactic acid 0.7> 1.2> 0.7  10/16 procal 0.72  Cardiac marker negative   Mag 2.0  BNP 12  INR 1.1  Lipase 16  D dimer 1.87  UDS + opiate and THC  U.a 2+ occult and leuko  Blood cultures NGTD x 2   Group a strept and flu negative     Escherichia coli       CULTURE, URINE     Amox/K Clav'ate <=8/4 mcg/mL Sensitive     Amp/Sulbactam 16/8 mcg/mL Intermediate     Ampicillin >16 mcg/mL Resistant     Cefazolin <=2 mcg/mL Sensitive     Cefepime <=2 mcg/mL Sensitive     Ceftriaxone <=1 mcg/mL Sensitive     Ciprofloxacin <=1 mcg/mL Sensitive     Ertapenem <=0.5 mcg/mL Sensitive     Gentamicin <=4 mcg/mL Sensitive     Levofloxacin <=2 mcg/mL Sensitive     Meropenem <=1 mcg/mL Sensitive     Nitrofurantoin <=32 mcg/mL Sensitive     Piperacillin/Tazo <=16 mcg/mL Sensitive     Tetracycline >8 mcg/mL  Resistant     Tobramycin <=4 mcg/mL Sensitive     Trimeth/Sulfa <=2/38 mcg/mL Sensitive                  Significant Imaging:     10/16 CT abd and pelvis No evidence for aortic dissection or pulmonary embolus.     Mild bronchitis.     Imaging findings suggestive for bilateral pyelonephritis.  Correlation with urinalysis is recommended.     The common bile duct is slightly prominent at 8 mm distal stone, mass and/or stricture not excluded.  Previous MRI of the abdomen show the common bile duct to also be dilated and as such this is a chronic finding.    10/16 CTA chest No evidence for aortic dissection or pulmonary embolus    10/16 CXR Mild diffuse interstitial prominence throughout the lungs, likely related to chronic lung changes.    EKG Normal sinus rhythm  Normal ECG  No previous ECGs available  Confirmed by Chaz King MD ([id number]) on 10/16/2020 11:39:41 PM    Pending Diagnostic Studies:     None         Medications:  Reconciled Home Medications:      Medication List      START taking these medications    diphenoxylate-atropine 2.5-0.025 mg 2.5-0.025 mg per tablet  Commonly known as: LOMOTIL  Take 1 tablet by mouth 4 (four) times daily as needed for Diarrhea.     sulfamethoxazole-trimethoprim 800-160mg 800-160 mg Tab  Commonly known as: BACTRIM DS  Take 1 tablet by mouth 2 (two) times daily.        CONTINUE taking these medications    amitriptyline 50 MG tablet  Commonly known as: ELAVIL  Take 50 mg by mouth every evening.     gabapentin 300 MG capsule  Commonly known as: NEURONTIN  Take 1 capsule (300 mg total) by mouth 3 (three) times daily.     HYDROcodone-acetaminophen  mg per tablet  Commonly known as: NORCO  Take 1 tablet by mouth 4 (four) times daily as needed.     TYLENOL EXTRA STRENGTH ORAL  Take by mouth.            Indwelling Lines/Drains at time of discharge:   Lines/Drains/Airways     None                 Time spent on the discharge of patient: 20 minutes  Patient was seen and examined on the date  of discharge and determined to be suitable for discharge.         Susan Hartley NP  Department of Hospital Medicine  [facility]

## 2020-10-19 NOTE — PLAN OF CARE
Pt maintained on Rocephin IV for UTI/ pyelonephritis. Awaiting cultures. WBC trending down. Pt remains afebrile. Second COVID test came back negative. Pt continues with decreased taste and smell. Pt also continues with diarrhea. Given one dose of lomitil this am. MD order stool studies and cdiff. Pt has not had a BM since. Potassium WNL today (see results). NS infusing at 125cc/hr.Plan of care discussed with pt. All questions answered.

## 2020-10-19 NOTE — ASSESSMENT & PLAN NOTE
IV Rocehpin  IVF  Blood and Urine Cx reviewed  Blood cultures NGTD and urine sensitive to rocephin day 4 and multiple po options

## 2020-10-19 NOTE — SUBJECTIVE & OBJECTIVE
Interval History: see      Review of Systems   Constitutional: Negative for fever.   Cardiovascular: Negative for chest pain.   Gastrointestinal: Negative for diarrhea.   Genitourinary: Negative for flank pain.     Objective:     Vital Signs (Most Recent):  Temp: 96 °F (35.6 °C) (10/19/20 0840)  Pulse: 83 (10/19/20 1000)  Resp: 16 (10/19/20 0840)  BP: 126/71 (10/19/20 0840)  SpO2: 100 % (10/19/20 0840) Vital Signs (24h Range):  Temp:  [96 °F (35.6 °C)-98.9 °F (37.2 °C)] 96 °F (35.6 °C)  Pulse:  [75-93] 83  Resp:  [16-18] 16  SpO2:  [97 %-100 %] 100 %  BP: (126-140)/(66-82) 126/71     Weight: 54.5 kg (120 lb 2.4 oz)  Body mass index is 21.98 kg/m².    Intake/Output Summary (Last 24 hours) at 10/19/2020 1106  Last data filed at 10/19/2020 0900  Gross per 24 hour   Intake 300 ml   Output --   Net 300 ml      Physical Exam  Constitutional:       Appearance: She is well-developed. She is not ill-appearing.      Comments: Looks much older than stated age    HENT:      Head: Normocephalic and atraumatic.      Right Ear: External ear normal.      Left Ear: External ear normal.      Nose: Nose normal.   Eyes:      Pupils: Pupils are equal, round, and reactive to light.   Neck:      Musculoskeletal: Normal range of motion and neck supple.      Thyroid: No thyromegaly.      Vascular: No JVD.      Trachea: No tracheal deviation.   Cardiovascular:      Rate and Rhythm: Normal rate.      Heart sounds: Normal heart sounds. No murmur.   Pulmonary:      Effort: Pulmonary effort is normal. No respiratory distress.      Breath sounds: Normal breath sounds. No wheezing or rales.   Chest:      Chest wall: No tenderness.   Abdominal:      General: Bowel sounds are normal. There is no distension.      Palpations: Abdomen is soft. There is no mass.      Tenderness: There is no abdominal tenderness. There is no guarding or rebound.   Musculoskeletal: Normal range of motion.         General: No tenderness.      Comments: CVA TTP  Improved     Lymphadenopathy:      Cervical: No cervical adenopathy.   Skin:     General: Skin is warm and dry.      Coloration: Skin is not pale.      Findings: No erythema or rash.   Neurological:      Mental Status: She is alert and oriented to person, place, and time.      Cranial Nerves: No cranial nerve deficit.      Motor: No abnormal muscle tone.      Coordination: Coordination normal.      Deep Tendon Reflexes: Reflexes are normal and symmetric. Reflexes normal.   Psychiatric:         Behavior: Behavior normal.         Thought Content: Thought content normal.         Judgment: Judgment normal.         Significant Labs: covid rapid and PCR -- negative   CBC:   Recent Labs   Lab 10/18/20  1330   WBC 10.17   HGB 12.0   HCT 36.4*   *     CMP:   Recent Labs   Lab 10/17/20  1702 10/18/20  1330    138   K 4.0 3.8    108   CO2 16* 18*   GLU 99 111*   BUN 6 6   CREATININE 0.7 0.7   CALCIUM 9.0 9.1   ANIONGAP 15 12   EGFRNONAA >60 >60     10/17 lactic acid 0.7> 1.2> 0.7  10/16 procal 0.72  Cardiac marker negative   Mag 2.0  BNP 12  INR 1.1  Lipase 16  D dimer 1.87  UDS + opiate and THC  U.a 2+ occult and leuko  Blood cultures NGTD x 2   Group a strept and flu negative     Escherichia coli       CULTURE, URINE     Amox/K Clav'ate <=8/4 mcg/mL Sensitive     Amp/Sulbactam 16/8 mcg/mL Intermediate     Ampicillin >16 mcg/mL Resistant     Cefazolin <=2 mcg/mL Sensitive     Cefepime <=2 mcg/mL Sensitive     Ceftriaxone <=1 mcg/mL Sensitive     Ciprofloxacin <=1 mcg/mL Sensitive     Ertapenem <=0.5 mcg/mL Sensitive     Gentamicin <=4 mcg/mL Sensitive     Levofloxacin <=2 mcg/mL Sensitive     Meropenem <=1 mcg/mL Sensitive     Nitrofurantoin <=32 mcg/mL Sensitive     Piperacillin/Tazo <=16 mcg/mL Sensitive     Tetracycline >8 mcg/mL Resistant     Tobramycin <=4 mcg/mL Sensitive     Trimeth/Sulfa <=2/38 mcg/mL Sensitive                  Significant Imaging:     10/16 CT abd and pelvis No evidence for aortic dissection  or pulmonary embolus.     Mild bronchitis.     Imaging findings suggestive for bilateral pyelonephritis.  Correlation with urinalysis is recommended.     The common bile duct is slightly prominent at 8 mm distal stone, mass and/or stricture not excluded.  Previous MRI of the abdomen show the common bile duct to also be dilated and as such this is a chronic finding.    10/16 CTA chest No evidence for aortic dissection or pulmonary embolus    10/16 CXR Mild diffuse interstitial prominence throughout the lungs, likely related to chronic lung changes.    EKG Normal sinus rhythm  Normal ECG  No previous ECGs available  Confirmed by Chaz King MD ([id number]) on 10/16/2020 11:39:41 PM

## 2020-10-19 NOTE — PROGRESS NOTES
Spoke to Ms. Martinez about the purpose of pharmacy education. Patient verified name/ via phone. We spoke about her abx and pain meds. Patient states her medications are explained to her and denies n/v and uncontrolled pain. Patient elects bedside delivery of any discharge medications.

## 2020-10-19 NOTE — NURSING
Discharged per MD order, stable condition, all personal items at side. Pleasant mood. CHARITY Patel escorted patient.    Discharge orders reviewed with patient, verbalized understanding, read back noted. All questions/concerns addressed.

## 2020-10-19 NOTE — PLAN OF CARE
10/19/20 1151   Medicare Message   Important Message from Medicare regarding Discharge Appeal Rights Given to patient/caregiver;Explained to patient/caregiver;Signed/date by patient/caregiver   Date IMM was signed 10/19/20   Time IMM was signed 0800     Signed for discharge.

## 2020-10-19 NOTE — PLAN OF CARE
10/19/20 1328   Final Note   Assessment Type Final Discharge Note   Anticipated Discharge Disposition Home   What phone number can be called within the next 1-3 days to see how you are doing after discharge? [phone]   Hospital Follow Up  Appt(s) scheduled? Yes   Discharge plans and expectations educations in teach back method with documentation complete? Yes       No post-acute care needs identified during this hospital stay.     Emelyn Urrutia LMSW

## 2020-10-19 NOTE — PLAN OF CARE
10/19/20 1328   Post-Acute Status   Post-Acute Authorization Other   Other Status No Post-Acute Service Needs   Discharge Delays None known at this time

## 2020-10-19 NOTE — DISCHARGE INSTRUCTIONS
Discharge Instructions: Eating a High-Potassium Diet  Your healthcare provider has told you to eat a high-potassium diet. This may be because you have low levels of potassium in your blood. Or it may be because you have high blood pressure. Potassium is found in many foods. These include dairy products, nuts, seeds, and beans. Its also found in many fruits and vegetables in high amounts.  Guidelines for a high-potassium diet  · Eat fruits and vegetables in their fresh or raw form most often.  · Check labels for ingredients that have potassium. This includes potassium chloride. Add these items to your diet.  · Try salt substitutes. Many of these have potassium.  · Avoid licorice. This includes licorice root and teas that have licorice. These can ewa your body of potassium.  Eat plenty of the following high-potassium foods:  · Fruits. Good choices are apricots (canned and fresh), bananas, cantaloupe, honeydew melon, kiwi, nectarines, oranges, orange juice, and pears. Dried fruits include apricots, dates, figs, and prunes. Prune juice also has potassium.  · Vegetables. Good choices are asparagus, avocado, artichoke, broccoli, bamboo shoots, beets, brussels sprouts, cabbage, celery, chard, okra, potatoes (white and sweet), pumpkin, rutabaga, spinach (cooked), squash, tomatoes. Also good are tomato sauce, tomato juice, and vegetable juice cocktail.  · Chicken, fish, clams, and crab  · Milk, chocolate milk, buttermilk, and soy milk  · Legumes. These include black-eyed peas, chickpeas, lentils, lima beans, navy beans, red kidney beans, soybeans, and split peas.  · Nuts and seeds. Try almonds, Brazil nuts, cashews, peanuts, peanut butter, pecans, pumpkin seeds, sunflower seeds, and walnuts.  · Breads and cereals. These include bran and whole-grain products.  · Others foods include chocolate, cocoa, coconut milk, and molasses  Follow-up  Make a follow-up appointment for a repeat test. Our staff can help you do this.  When  to call your healthcare provider  Call your healthcare provider right away or go to the ER if you have any of the following:  · Vomiting  · Fatigue  · Diarrhea  · Rapid, irregular heartbeat  · Shortness of breath  · Chest pain  · Muscle cramps, spasms, or twitching  · Weakness  · Paralysis   Date Last Reviewed: 6/20/2015  © 7208-4609 [employer]. [address] [place] [id number]. All rights reserved. This information is not intended as a substitute for professional medical care. Always follow your healthcare professional's instructions.      Discharge instructions reviewed, verbalized understanding with read back

## 2020-10-19 NOTE — PROGRESS NOTES
[facility] Medicine  Progress Note    Patient Name: Carmen Martinez  MRN: [id number]  Patient Class: IP- Inpatient   Admission Date: 10/16/2020  Length of Stay: 1 days  Attending Physician: Henrique Camejo MD  Primary Care Provider: Tyrese Parmar MD        Subjective:     Principal Problem:Pyelonephritis        HPI:  60 year old female with 5-6 day history of dysuria fatigue, decreased appetite, vomiting, presents to ED.    Work up in ED is significant for pyelonephritis bilateral kidneys and lactate of 3.4  She is given 3 liters of NS in the ED. HR in 110s, now in the 90s  BP is 119 systolic   Temp 100.1  Placed on IV Rocephin and IVF   Blood Cx and Urine Cx pending       Overview/Hospital Course:  10/17  Tmax 100.2   WBC down to 13  K is 2.6 after KCL 60meq---ordered KCL 60 TID and a Krun of 40   Nurse reports pt c/o loss of smell and taste. Covid rapid in ED was negative     10/18  AFVSS   WBC normal   K improved to 4  REmains with profuse diarrhea     10/19 day 4 rocephin for e coli sensitive UTI. Afebrile. elevated WBC resolved. covid rapid and PCR negative. Continue with decrease smell and taste. Diarrhea yesterday. Lomotil given and has not had again. Stool studies ordered but none collected as it resolved with lomotil.     Interval History: see HC     Review of Systems   Constitutional: Negative for fever.   Cardiovascular: Negative for chest pain.   Gastrointestinal: Negative for diarrhea.   Genitourinary: Negative for flank pain.     Objective:     Vital Signs (Most Recent):  Temp: 96 °F (35.6 °C) (10/19/20 0840)  Pulse: 83 (10/19/20 1000)  Resp: 16 (10/19/20 0840)  BP: 126/71 (10/19/20 0840)  SpO2: 100 % (10/19/20 0840) Vital Signs (24h Range):  Temp:  [96 °F (35.6 °C)-98.9 °F (37.2 °C)] 96 °F (35.6 °C)  Pulse:  [75-93] 83  Resp:  [16-18] 16  SpO2:  [97 %-100 %] 100 %  BP: (126-140)/(66-82) 126/71     Weight: 54.5 kg (120 lb 2.4 oz)  Body mass index is 21.98  kg/m².    Intake/Output Summary (Last 24 hours) at 10/19/2020 1106  Last data filed at 10/19/2020 0900  Gross per 24 hour   Intake 300 ml   Output --   Net 300 ml      Physical Exam  Constitutional:       Appearance: She is well-developed. She is not ill-appearing.      Comments: Looks much older than stated age    HENT:      Head: Normocephalic and atraumatic.      Right Ear: External ear normal.      Left Ear: External ear normal.      Nose: Nose normal.   Eyes:      Pupils: Pupils are equal, round, and reactive to light.   Neck:      Musculoskeletal: Normal range of motion and neck supple.      Thyroid: No thyromegaly.      Vascular: No JVD.      Trachea: No tracheal deviation.   Cardiovascular:      Rate and Rhythm: Normal rate.      Heart sounds: Normal heart sounds. No murmur.   Pulmonary:      Effort: Pulmonary effort is normal. No respiratory distress.      Breath sounds: Normal breath sounds. No wheezing or rales.   Chest:      Chest wall: No tenderness.   Abdominal:      General: Bowel sounds are normal. There is no distension.      Palpations: Abdomen is soft. There is no mass.      Tenderness: There is no abdominal tenderness. There is no guarding or rebound.   Musculoskeletal: Normal range of motion.         General: No tenderness.      Comments: CVA TTP  Improved    Lymphadenopathy:      Cervical: No cervical adenopathy.   Skin:     General: Skin is warm and dry.      Coloration: Skin is not pale.      Findings: No erythema or rash.   Neurological:      Mental Status: She is alert and oriented to person, place, and time.      Cranial Nerves: No cranial nerve deficit.      Motor: No abnormal muscle tone.      Coordination: Coordination normal.      Deep Tendon Reflexes: Reflexes are normal and symmetric. Reflexes normal.   Psychiatric:         Behavior: Behavior normal.         Thought Content: Thought content normal.         Judgment: Judgment normal.         Significant Labs: covid rapid and PCR --  negative   CBC:   Recent Labs   Lab 10/18/20  1330   WBC 10.17   HGB 12.0   HCT 36.4*   *     CMP:   Recent Labs   Lab 10/17/20  1702 10/18/20  1330    138   K 4.0 3.8    108   CO2 16* 18*   GLU 99 111*   BUN 6 6   CREATININE 0.7 0.7   CALCIUM 9.0 9.1   ANIONGAP 15 12   EGFRNONAA >60 >60     10/17 lactic acid 0.7> 1.2> 0.7  10/16 procal 0.72  Cardiac marker negative   Mag 2.0  BNP 12  INR 1.1  Lipase 16  D dimer 1.87  UDS + opiate and THC  U.a 2+ occult and leuko  Blood cultures NGTD x 2   Group a strept and flu negative     Escherichia coli       CULTURE, URINE     Amox/K Clav'ate <=8/4 mcg/mL Sensitive     Amp/Sulbactam 16/8 mcg/mL Intermediate     Ampicillin >16 mcg/mL Resistant     Cefazolin <=2 mcg/mL Sensitive     Cefepime <=2 mcg/mL Sensitive     Ceftriaxone <=1 mcg/mL Sensitive     Ciprofloxacin <=1 mcg/mL Sensitive     Ertapenem <=0.5 mcg/mL Sensitive     Gentamicin <=4 mcg/mL Sensitive     Levofloxacin <=2 mcg/mL Sensitive     Meropenem <=1 mcg/mL Sensitive     Nitrofurantoin <=32 mcg/mL Sensitive     Piperacillin/Tazo <=16 mcg/mL Sensitive     Tetracycline >8 mcg/mL Resistant     Tobramycin <=4 mcg/mL Sensitive     Trimeth/Sulfa <=2/38 mcg/mL Sensitive                  Significant Imaging:     10/16 CT abd and pelvis No evidence for aortic dissection or pulmonary embolus.     Mild bronchitis.     Imaging findings suggestive for bilateral pyelonephritis.  Correlation with urinalysis is recommended.     The common bile duct is slightly prominent at 8 mm distal stone, mass and/or stricture not excluded.  Previous MRI of the abdomen show the common bile duct to also be dilated and as such this is a chronic finding.    10/16 CTA chest No evidence for aortic dissection or pulmonary embolus    10/16 CXR Mild diffuse interstitial prominence throughout the lungs, likely related to chronic lung changes.    EKG Normal sinus rhythm  Normal ECG  No previous ECGs available  Confirmed by Christine DHILLON,  Chaz ([id number]) on 10/16/2020 11:39:41 PM         Assessment/Plan:      * Pyelonephritis  IV Rocehpin  IVF  Blood and Urine Cx reviewed  Blood cultures NGTD and urine sensitive to rocephin day 4 and multiple po options      Positive D dimer  CTA in ER negative for PE      Smell or taste sensation disturbance  In light of her diarrhea and SOB, will recheck for covid, this time a send out test, which has higher sensitivity--- this is negative as well       Sepsis without acute organ dysfunction  3 liters NS in ED   NS at 125/hour   Lactic q 4 til normal   Continue rocephin   BP and HR now normal     Chronic pain  Lex, TCA, and norco ordered as she takes at home       Tobacco abuse  Denies need for nicotine patch         VTE Risk Mitigation (From admission, onward)         Ordered     IP VTE LOW RISK PATIENT  Once      10/16/20 1826     Place sequential compression device  Until discontinued      10/16/20 1826                Discharge Planning   YOAV: 10/19/2020     Code Status: Full Code   Is the patient medically ready for discharge?:     Reason for patient still in hospital (select all that apply): Pending disposition  Discharge Plan A: Home with family                  Henrique Camejo MD  Department of Hospital Medicine   [facility]

## 2020-10-19 NOTE — PLAN OF CARE
Plan of care reviewed with patient. Verbalized understanding. Plan is to discharge home, denies wanting flu or pneumonia vaccine, denies wanting Rx delivered to bedside prior to discharge.

## 2020-10-19 NOTE — PLAN OF CARE
Ms Martinez will discharge home today with no post acute care needs. CM contact information given. Patient able to repeat elevated temp, abdominal or flank pain, nausea or vomiting as signs or symptoms necessitating a return to the ED.

## 2020-10-19 NOTE — SUBJECTIVE & OBJECTIVE
Interval History: see      Review of Systems   Constitutional: Negative for fever.   Cardiovascular: Negative for chest pain.   Gastrointestinal: Positive for diarrhea.     Objective:     Vital Signs (Most Recent):  Temp: 98.5 °F (36.9 °C) (10/19/20 0350)  Pulse: 75 (10/19/20 0600)  Resp: 18 (10/19/20 0350)  BP: 131/66 (10/19/20 0350)  SpO2: 98 % (10/19/20 0350) Vital Signs (24h Range):  Temp:  [97.7 °F (36.5 °C)-98.9 °F (37.2 °C)] 98.5 °F (36.9 °C)  Pulse:  [75-93] 75  Resp:  [18] 18  SpO2:  [97 %-100 %] 98 %  BP: (123-140)/(66-82) 131/66     Weight: 54.5 kg (120 lb 2.4 oz)  Body mass index is 21.98 kg/m².    Intake/Output Summary (Last 24 hours) at 10/19/2020 0737  Last data filed at 10/18/2020 1200  Gross per 24 hour   Intake 360 ml   Output --   Net 360 ml      Physical Exam  Constitutional:       Appearance: She is well-developed. She is not ill-appearing.      Comments: Looks much older than stated age    HENT:      Head: Normocephalic and atraumatic.      Right Ear: External ear normal.      Left Ear: External ear normal.      Nose: Nose normal.   Eyes:      Pupils: Pupils are equal, round, and reactive to light.   Neck:      Musculoskeletal: Normal range of motion and neck supple.      Thyroid: No thyromegaly.      Vascular: No JVD.      Trachea: No tracheal deviation.   Cardiovascular:      Rate and Rhythm: Normal rate.      Heart sounds: Normal heart sounds. No murmur.   Pulmonary:      Effort: Pulmonary effort is normal. No respiratory distress.      Breath sounds: Normal breath sounds. No wheezing or rales.   Chest:      Chest wall: No tenderness.   Abdominal:      General: Bowel sounds are normal. There is no distension.      Palpations: Abdomen is soft. There is no mass.      Tenderness: There is no abdominal tenderness. There is no guarding or rebound.   Musculoskeletal: Normal range of motion.         General: Tenderness present.      Comments: CVA TTP  Improved    Lymphadenopathy:      Cervical:  No cervical adenopathy.   Skin:     General: Skin is warm and dry.      Coloration: Skin is not pale.      Findings: No erythema or rash.   Neurological:      Mental Status: She is alert and oriented to person, place, and time.      Cranial Nerves: No cranial nerve deficit.      Motor: No abnormal muscle tone.      Coordination: Coordination normal.      Deep Tendon Reflexes: Reflexes are normal and symmetric. Reflexes normal.   Psychiatric:         Behavior: Behavior normal.         Thought Content: Thought content normal.         Judgment: Judgment normal.         Significant Labs: covid rapid and PCR -- negative   CBC:   Recent Labs   Lab 10/18/20  1330   WBC 10.17   HGB 12.0   HCT 36.4*   *     CMP:   Recent Labs   Lab 10/17/20  1702 10/18/20  1330    138   K 4.0 3.8    108   CO2 16* 18*   GLU 99 111*   BUN 6 6   CREATININE 0.7 0.7   CALCIUM 9.0 9.1   ANIONGAP 15 12   EGFRNONAA >60 >60     10/17 lactic acid 0.7> 1.2> 0.7  10/16 procal 0.72  Cardiac marker negative   Mag 2.0  BNP 12  INR 1.1  Lipase 16  D dimer 1.87  UDS + opiate and THC  U.a 2+ occult and leuko  Blood cultures NGTD x 2   Group a strept and flu negative     Escherichia coli       CULTURE, URINE     Amox/K Clav'ate <=8/4 mcg/mL Sensitive     Amp/Sulbactam 16/8 mcg/mL Intermediate     Ampicillin >16 mcg/mL Resistant     Cefazolin <=2 mcg/mL Sensitive     Cefepime <=2 mcg/mL Sensitive     Ceftriaxone <=1 mcg/mL Sensitive     Ciprofloxacin <=1 mcg/mL Sensitive     Ertapenem <=0.5 mcg/mL Sensitive     Gentamicin <=4 mcg/mL Sensitive     Levofloxacin <=2 mcg/mL Sensitive     Meropenem <=1 mcg/mL Sensitive     Nitrofurantoin <=32 mcg/mL Sensitive     Piperacillin/Tazo <=16 mcg/mL Sensitive     Tetracycline >8 mcg/mL Resistant     Tobramycin <=4 mcg/mL Sensitive     Trimeth/Sulfa <=2/38 mcg/mL Sensitive                  Significant Imaging:     10/16 CT abd and pelvis No evidence for aortic dissection or pulmonary embolus.     Mild  bronchitis.     Imaging findings suggestive for bilateral pyelonephritis.  Correlation with urinalysis is recommended.     The common bile duct is slightly prominent at 8 mm distal stone, mass and/or stricture not excluded.  Previous MRI of the abdomen show the common bile duct to also be dilated and as such this is a chronic finding.    10/16 CTA chest No evidence for aortic dissection or pulmonary embolus    10/16 CXR Mild diffuse interstitial prominence throughout the lungs, likely related to chronic lung changes.    EKG Normal sinus rhythm  Normal ECG  No previous ECGs available  Confirmed by Chaz King MD ([id number]) on 10/16/2020 11:39:41 PM

## 2020-10-21 LAB
BACTERIA BLD CULT: NORMAL
BACTERIA BLD CULT: NORMAL

## 2021-05-06 ENCOUNTER — PATIENT MESSAGE (OUTPATIENT)
Dept: RESEARCH | Facility: HOSPITAL | Age: 61
End: 2021-05-06

## 2021-05-10 ENCOUNTER — PATIENT MESSAGE (OUTPATIENT)
Dept: RESEARCH | Facility: HOSPITAL | Age: 61
End: 2021-05-10

## 2021-07-01 ENCOUNTER — PATIENT MESSAGE (OUTPATIENT)
Dept: ADMINISTRATIVE | Facility: OTHER | Age: 61
End: 2021-07-01

## 2021-07-04 ENCOUNTER — TELEPHONE (OUTPATIENT)
Dept: EMERGENCY MEDICINE | Facility: HOSPITAL | Age: 61
End: 2021-07-04

## 2021-07-04 ENCOUNTER — HOSPITAL ENCOUNTER (EMERGENCY)
Facility: HOSPITAL | Age: 61
Discharge: HOME | End: 2021-07-04
Attending: SURGERY
Payer: MEDICARE

## 2021-07-04 VITALS
BODY MASS INDEX: 18.97 KG/M2 | SYSTOLIC BLOOD PRESSURE: 125 MMHG | DIASTOLIC BLOOD PRESSURE: 70 MMHG | HEART RATE: 92 BPM | RESPIRATION RATE: 20 BRPM | TEMPERATURE: 98 F | WEIGHT: 103.75 LBS | OXYGEN SATURATION: 98 %

## 2021-07-04 DIAGNOSIS — S90.561A INSECT BITE OF RIGHT ANKLE, INITIAL ENCOUNTER: Primary | ICD-10-CM
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
## 2021-07-04 PROCEDURE — 25000003 PHARM REV CODE 250: Performed by: SURGERY

## 2021-07-04 PROCEDURE — 90471 IMMUNIZATION ADMIN: CPT | Performed by: SURGERY

## 2021-07-04 PROCEDURE — 90715 TDAP VACCINE 7 YRS/> IM: CPT | Performed by: SURGERY

## 2021-07-04 PROCEDURE — 63600175 PHARM REV CODE 636 W HCPCS: Performed by: SURGERY

## 2021-07-04 PROCEDURE — 96372 THER/PROPH/DIAG INJ SC/IM: CPT

## 2021-07-04 PROCEDURE — 99284 EMERGENCY DEPT VISIT MOD MDM: CPT | Mod: 25

## 2021-07-04 RX ORDER — MUPIROCIN 20 MG/G
OINTMENT TOPICAL 3 TIMES DAILY
Qty: 15 G | Refills: 0 | Status: SHIPPED | OUTPATIENT
Start: 2021-07-04 | End: 2021-07-14

## 2021-07-04 RX ORDER — IBUPROFEN 800 MG/1
800 TABLET ORAL EVERY 6 HOURS PRN
Qty: 20 TABLET | Refills: 0 | Status: SHIPPED | OUTPATIENT
Start: 2021-07-04

## 2021-07-04 RX ORDER — CLINDAMYCIN HYDROCHLORIDE 300 MG/1
300 CAPSULE ORAL 4 TIMES DAILY
Qty: 28 CAPSULE | Refills: 0 | Status: SHIPPED | OUTPATIENT
Start: 2021-07-04 | End: 2021-07-11

## 2021-07-04 RX ORDER — IBUPROFEN 800 MG/1
800 TABLET ORAL EVERY 6 HOURS PRN
Qty: 20 TABLET | Refills: 0 | Status: SHIPPED | OUTPATIENT
Start: 2021-07-04 | End: 2021-07-04 | Stop reason: SDUPTHER

## 2021-07-04 RX ORDER — MUPIROCIN 20 MG/G
1 OINTMENT TOPICAL
Status: COMPLETED | OUTPATIENT
Start: 2021-07-04 | End: 2021-07-04

## 2021-07-04 RX ORDER — CLINDAMYCIN HYDROCHLORIDE 300 MG/1
300 CAPSULE ORAL 4 TIMES DAILY
Qty: 28 CAPSULE | Refills: 0 | Status: SHIPPED | OUTPATIENT
Start: 2021-07-04 | End: 2021-07-04 | Stop reason: SDUPTHER

## 2021-07-04 RX ORDER — MUPIROCIN 20 MG/G
OINTMENT TOPICAL 3 TIMES DAILY
Qty: 15 G | Refills: 0 | Status: SHIPPED | OUTPATIENT
Start: 2021-07-04 | End: 2021-07-04 | Stop reason: SDUPTHER

## 2021-07-04 RX ORDER — CLINDAMYCIN PHOSPHATE 150 MG/ML
600 INJECTION, SOLUTION INTRAVENOUS
Status: COMPLETED | OUTPATIENT
Start: 2021-07-04 | End: 2021-07-04

## 2021-07-04 RX ADMIN — MUPIROCIN 22 G: 20 OINTMENT TOPICAL at 11:07

## 2021-07-04 RX ADMIN — CLOSTRIDIUM TETANI TOXOID ANTIGEN (FORMALDEHYDE INACTIVATED), CORYNEBACTERIUM DIPHTHERIAE TOXOID ANTIGEN (FORMALDEHYDE INACTIVATED), BORDETELLA PERTUSSIS TOXOID ANTIGEN (GLUTARALDEHYDE INACTIVATED), BORDETELLA PERTUSSIS FILAMENTOUS HEMAGGLUTININ ANTIGEN (FORMALDEHYDE INACTIVATED), BORDETELLA PERTUSSIS PERTACTIN ANTIGEN, AND BORDETELLA PERTUSSIS FIMBRIAE 2/3 ANTIGEN 0.5 ML: 5; 2; 2.5; 5; 3; 5 INJECTION, SUSPENSION INTRAMUSCULAR at 11:07

## 2021-07-04 RX ADMIN — CLINDAMYCIN 600 MG: 150 INJECTION, SOLUTION INTRAMUSCULAR; INTRAVENOUS at 11:07

## 2023-08-15 ENCOUNTER — HOSPITAL ENCOUNTER (OUTPATIENT)
Dept: RADIOLOGY | Facility: HOSPITAL | Age: 63
Discharge: HOME | End: 2023-08-15
Attending: PAIN MEDICINE
Payer: MEDICARE

## 2023-08-15 DIAGNOSIS — M25.552 BILATERAL HIP PAIN: ICD-10-CM

## 2023-08-15 DIAGNOSIS — M25.551 BILATERAL HIP PAIN: ICD-10-CM

## 2023-08-15 PROCEDURE — 73521 XR HIPS BILATERAL 2 VIEW INCL AP PELVIS: ICD-10-PCS | Mod: 26,,, | Performed by: RADIOLOGY

## 2023-08-15 PROCEDURE — 73521 X-RAY EXAM HIPS BI 2 VIEWS: CPT | Mod: TC

## 2023-08-15 PROCEDURE — 73521 X-RAY EXAM HIPS BI 2 VIEWS: CPT | Mod: 26,,, | Performed by: RADIOLOGY

## 2025-01-11 ENCOUNTER — HOSPITAL ENCOUNTER (EMERGENCY)
Facility: HOSPITAL | Age: 65
Discharge: HOME | End: 2025-01-11
Attending: SURGERY
Payer: MEDICARE

## 2025-01-11 VITALS
HEIGHT: 62 IN | RESPIRATION RATE: 16 BRPM | HEART RATE: 105 BPM | BODY MASS INDEX: 22.26 KG/M2 | OXYGEN SATURATION: 98 % | DIASTOLIC BLOOD PRESSURE: 81 MMHG | SYSTOLIC BLOOD PRESSURE: 134 MMHG | TEMPERATURE: 99 F | WEIGHT: 120.94 LBS

## 2025-01-11 DIAGNOSIS — H60.502 ACUTE OTITIS EXTERNA OF LEFT EAR, UNSPECIFIED TYPE: Primary | ICD-10-CM

## 2025-01-11 DIAGNOSIS — H66.92 LEFT OTITIS MEDIA, UNSPECIFIED OTITIS MEDIA TYPE: ICD-10-CM

## 2025-01-11 PROCEDURE — 99284 EMERGENCY DEPT VISIT MOD MDM: CPT

## 2025-01-11 RX ORDER — AMOXICILLIN 875 MG/1
875 TABLET, FILM COATED ORAL 2 TIMES DAILY
Qty: 14 TABLET | Refills: 0 | Status: SHIPPED | OUTPATIENT
Start: 2025-01-11 | End: 2025-01-18

## 2025-01-11 RX ORDER — NEOMYCIN SULFATE, POLYMYXIN B SULFATE AND HYDROCORTISONE 10; 3.5; 1 MG/ML; MG/ML; [USP'U]/ML
4 SUSPENSION/ DROPS AURICULAR (OTIC) 3 TIMES DAILY
Qty: 6 ML | Refills: 0 | Status: SHIPPED | OUTPATIENT
Start: 2025-01-11 | End: 2025-01-21

## 2025-01-12 NOTE — ED PROVIDER NOTES
Encounter Date: 1/11/2025       History     Chief Complaint   Patient presents with    Otalgia     Patient reports pain in left ear for 1 - 1.5 weeks.  No fever.  Reports getting some pus out of her ear today.  No new cough, congestion, or nasal drainage.     History of Present Illness  Carmen Martinez is a 64 y.o. female that presents with left earache today  Patient on exam has left otitis media & left otitis externa on ER evaluation  Normal hearing, no mastoid tenderness with no TM perforation on evaluation  Patient does not wear earplugs or hearing aids based on my interview tonight  Patient has no frequent ear infections, afebrile, no other complaints at this time    The history is provided by the patient.     Review of patient's allergies indicates:   Allergen Reactions    Codeine Itching     Past Medical History:   Diagnosis Date    Back pain     Bulging disc     Common bile duct dilation     Hepatitis     HCV-tx' w/ Harvoni 3/2018    Neck pain      Past Surgical History:   Procedure Laterality Date    COLONOSCOPY  2014     Family History   Problem Relation Name Age of Onset    Heart disease Mother      Colon cancer Neg Hx       Social History     Tobacco Use    Smoking status: Every Day     Current packs/day: 1.00     Average packs/day: 1 pack/day for 48.3 years (48.3 ttl pk-yrs)     Types: Cigarettes     Start date: 9/21/1976    Smokeless tobacco: Never   Substance Use Topics    Alcohol use: No     Comment: heavy until 10 years ago    Drug use: No     Comment: NANDO in 80s     Review of Systems   Constitutional: Negative.    HENT:  Positive for ear discharge and ear pain.    Eyes: Negative.    Respiratory: Negative.     Cardiovascular: Negative.    Gastrointestinal: Negative.    Genitourinary: Negative.    Musculoskeletal: Negative.    Skin: Negative.    Neurological: Negative.    Psychiatric/Behavioral: Negative.         Physical Exam     Initial Vitals [01/11/25 2025]   BP Pulse Resp Temp SpO2   134/81  105 16 98.6 °F (37 °C) 98 %      MAP       --         Physical Exam    Nursing note and vitals reviewed.  Constitutional: Vital signs are normal. She appears well-developed and well-nourished. She is cooperative.   HENT:   Head: Normocephalic and atraumatic.   (+) left otitis media with intact TM, left otitis externa noted   Eyes: Conjunctivae, EOM and lids are normal. Pupils are equal, round, and reactive to light.   Neck: Trachea normal and phonation normal. Neck supple. No JVD present.   Normal range of motion.   Full passive range of motion without pain.     Cardiovascular:  Normal rate, regular rhythm, S1 normal, S2 normal, normal heart sounds, intact distal pulses and normal pulses.           Pulmonary/Chest: Effort normal and breath sounds normal.   Abdominal: Abdomen is soft and flat. Bowel sounds are normal.   Musculoskeletal:         General: Normal range of motion.      Cervical back: Full passive range of motion without pain, normal range of motion and neck supple.     Neurological: She is alert and oriented to person, place, and time. She has normal strength.   Skin: Skin is warm, dry and intact. Capillary refill takes less than 2 seconds.         ED Course     Medical Decision Making  Differential: flu, strep, COVID, bronchitis, pneumonia, URI, virus, otitis media    Problems Addressed:  Acute otitis externa of left ear, unspecified type: complicated acute illness or injury  Left otitis media, unspecified otitis media type: complicated acute illness or injury    ED Management & Risks of Complication, Morbidity, & Mortality:  Will start amoxicillin & otic drops for otitis media & externa  Follow-up with PCP in the next 48 hours as an outpatient  Pt/Family counseled to return to the ER with any concerns on DC    Need for Hospitalization or Surgery with Social Determinants of Health:  This patient does not need to be hospitalized on ER evaluation today  The patient's diagnosis is not limited by social  determinants of health  Does not require surgery or procedure (major or minor), no risk factors    Clinical Impression:  Final diagnoses:  [H60.502] Acute otitis externa of left ear, unspecified type (Primary)  [H66.92] Left otitis media, unspecified otitis media type          ED Disposition Condition    Discharge Stable          ED Prescriptions       Medication Sig Dispense Start Date End Date Auth. Provider    amoxicillin (AMOXIL) 875 MG tablet Take 1 tablet (875 mg total) by mouth 2 (two) times daily. for 7 days 14 tablet 1/11/2025 1/18/2025 Leroy Burgess MD    neomycin-polymyxin-hydrocortisone (CORTISPORIN) 3.5-10,000-1 mg/mL-unit/mL-% otic suspension Place 4 drops into the left ear 3 (three) times daily. for 10 days 6 mL 1/11/2025 1/21/2025 Leroy Burgess MD          Follow-up Information       Follow up With Specialties Details Why Contact Info    Shaun Jain MD Internal Medicine Schedule an appointment as soon as possible for a visit in 2 days  [address] [place] [id number]  [phone]               Leroy Burgess MD  01/11/25 2031

## 2025-03-25 NOTE — TELEPHONE ENCOUNTER
Attempted to contact patient for QOL assessment following completion of Harvoni; unable to reach patient - unable to leave voicemail.    Monty Berumen, PharmD  Clinical Pharmacist  [facility]  [phone]     Yes